# Patient Record
Sex: MALE | Race: WHITE | Employment: FULL TIME | URBAN - METROPOLITAN AREA
[De-identification: names, ages, dates, MRNs, and addresses within clinical notes are randomized per-mention and may not be internally consistent; named-entity substitution may affect disease eponyms.]

---

## 2024-02-05 ENCOUNTER — HOSPITAL ENCOUNTER (INPATIENT)
Facility: HOSPITAL | Age: 54
LOS: 3 days | Discharge: HOME | End: 2024-02-09
Attending: STUDENT IN AN ORGANIZED HEALTH CARE EDUCATION/TRAINING PROGRAM | Admitting: PSYCHIATRY & NEUROLOGY
Payer: COMMERCIAL

## 2024-02-05 DIAGNOSIS — R45.851 SUICIDAL IDEATION: Primary | ICD-10-CM

## 2024-02-05 DIAGNOSIS — F10.90 ALCOHOL USE DISORDER: ICD-10-CM

## 2024-02-05 DIAGNOSIS — F10.920 ACUTE ALCOHOLIC INTOXICATION WITHOUT COMPLICATION (HCC): ICD-10-CM

## 2024-02-05 LAB
ALBUMIN SERPL-MCNC: 3.8 G/DL (ref 3.5–5)
ALBUMIN/GLOB SERPL: 1 (ref 1.1–2.2)
ALP SERPL-CCNC: 293 U/L (ref 45–117)
ALT SERPL-CCNC: 73 U/L (ref 12–78)
AMPHET UR QL SCN: NEGATIVE
ANION GAP SERPL CALC-SCNC: 13 MMOL/L (ref 5–15)
APAP SERPL-MCNC: <2 UG/ML (ref 10–30)
APPEARANCE UR: CLEAR
AST SERPL-CCNC: 232 U/L (ref 15–37)
BACTERIA URNS QL MICRO: NEGATIVE /HPF
BARBITURATES UR QL SCN: NEGATIVE
BASOPHILS # BLD: 0.1 K/UL (ref 0–0.1)
BASOPHILS NFR BLD: 1 % (ref 0–1)
BENZODIAZ UR QL: NEGATIVE
BILIRUB SERPL-MCNC: 0.7 MG/DL (ref 0.2–1)
BILIRUB UR QL: NEGATIVE
BUN SERPL-MCNC: 8 MG/DL (ref 6–20)
BUN/CREAT SERPL: 9 (ref 12–20)
CALCIUM SERPL-MCNC: 8.5 MG/DL (ref 8.5–10.1)
CANNABINOIDS UR QL SCN: POSITIVE
CHLORIDE SERPL-SCNC: 104 MMOL/L (ref 97–108)
CO2 SERPL-SCNC: 25 MMOL/L (ref 21–32)
COCAINE UR QL SCN: NEGATIVE
COLOR UR: NORMAL
COMMENT:: NORMAL
CREAT SERPL-MCNC: 0.86 MG/DL (ref 0.7–1.3)
DIFFERENTIAL METHOD BLD: ABNORMAL
EOSINOPHIL # BLD: 0.1 K/UL (ref 0–0.4)
EOSINOPHIL NFR BLD: 1 % (ref 0–7)
EPITH CASTS URNS QL MICRO: NORMAL /LPF
ERYTHROCYTE [DISTWIDTH] IN BLOOD BY AUTOMATED COUNT: 12.7 % (ref 11.5–14.5)
ETHANOL SERPL-MCNC: 491 MG/DL (ref 0–0.08)
GLOBULIN SER CALC-MCNC: 3.9 G/DL (ref 2–4)
GLUCOSE SERPL-MCNC: 105 MG/DL (ref 65–100)
GLUCOSE UR STRIP.AUTO-MCNC: NEGATIVE MG/DL
HCT VFR BLD AUTO: 43.6 % (ref 36.6–50.3)
HGB BLD-MCNC: 15.6 G/DL (ref 12.1–17)
HGB UR QL STRIP: NEGATIVE
HYALINE CASTS URNS QL MICRO: NORMAL /LPF (ref 0–5)
IMM GRANULOCYTES # BLD AUTO: 0 K/UL (ref 0–0.04)
IMM GRANULOCYTES NFR BLD AUTO: 0 % (ref 0–0.5)
KETONES UR QL STRIP.AUTO: NEGATIVE MG/DL
LEUKOCYTE ESTERASE UR QL STRIP.AUTO: NEGATIVE
LYMPHOCYTES # BLD: 2.6 K/UL (ref 0.8–3.5)
LYMPHOCYTES NFR BLD: 28 % (ref 12–49)
Lab: ABNORMAL
MCH RBC QN AUTO: 36.5 PG (ref 26–34)
MCHC RBC AUTO-ENTMCNC: 35.8 G/DL (ref 30–36.5)
MCV RBC AUTO: 102.1 FL (ref 80–99)
METHADONE UR QL: NEGATIVE
MONOCYTES # BLD: 0.7 K/UL (ref 0–1)
MONOCYTES NFR BLD: 7 % (ref 5–13)
NEUTS SEG # BLD: 5.9 K/UL (ref 1.8–8)
NEUTS SEG NFR BLD: 63 % (ref 32–75)
NITRITE UR QL STRIP.AUTO: NEGATIVE
NRBC # BLD: 0 K/UL (ref 0–0.01)
NRBC BLD-RTO: 0 PER 100 WBC
OPIATES UR QL: NEGATIVE
PCP UR QL: NEGATIVE
PH UR STRIP: 6.5 (ref 5–8)
PLATELET # BLD AUTO: 341 K/UL (ref 150–400)
PMV BLD AUTO: 9.1 FL (ref 8.9–12.9)
POTASSIUM SERPL-SCNC: 3.6 MMOL/L (ref 3.5–5.1)
PROT SERPL-MCNC: 7.7 G/DL (ref 6.4–8.2)
PROT UR STRIP-MCNC: NEGATIVE MG/DL
RBC # BLD AUTO: 4.27 M/UL (ref 4.1–5.7)
RBC #/AREA URNS HPF: NORMAL /HPF (ref 0–5)
SALICYLATES SERPL-MCNC: 2.2 MG/DL (ref 2.8–20)
SODIUM SERPL-SCNC: 142 MMOL/L (ref 136–145)
SP GR UR REFRACTOMETRY: 1.01 (ref 1–1.03)
SPECIMEN HOLD: NORMAL
SPECIMEN HOLD: NORMAL
UROBILINOGEN UR QL STRIP.AUTO: 0.2 EU/DL (ref 0.2–1)
WBC # BLD AUTO: 9.3 K/UL (ref 4.1–11.1)
WBC URNS QL MICRO: NORMAL /HPF (ref 0–4)

## 2024-02-05 PROCEDURE — 36415 COLL VENOUS BLD VENIPUNCTURE: CPT

## 2024-02-05 PROCEDURE — 81001 URINALYSIS AUTO W/SCOPE: CPT

## 2024-02-05 PROCEDURE — 85025 COMPLETE CBC W/AUTO DIFF WBC: CPT

## 2024-02-05 PROCEDURE — 99285 EMERGENCY DEPT VISIT HI MDM: CPT

## 2024-02-05 PROCEDURE — 80307 DRUG TEST PRSMV CHEM ANLYZR: CPT

## 2024-02-05 PROCEDURE — 82077 ASSAY SPEC XCP UR&BREATH IA: CPT

## 2024-02-05 PROCEDURE — 80053 COMPREHEN METABOLIC PANEL: CPT

## 2024-02-05 PROCEDURE — 96374 THER/PROPH/DIAG INJ IV PUSH: CPT

## 2024-02-05 PROCEDURE — 6370000000 HC RX 637 (ALT 250 FOR IP): Performed by: STUDENT IN AN ORGANIZED HEALTH CARE EDUCATION/TRAINING PROGRAM

## 2024-02-05 PROCEDURE — 90791 PSYCH DIAGNOSTIC EVALUATION: CPT

## 2024-02-05 PROCEDURE — 6360000002 HC RX W HCPCS: Performed by: STUDENT IN AN ORGANIZED HEALTH CARE EDUCATION/TRAINING PROGRAM

## 2024-02-05 PROCEDURE — 80179 DRUG ASSAY SALICYLATE: CPT

## 2024-02-05 PROCEDURE — 80143 DRUG ASSAY ACETAMINOPHEN: CPT

## 2024-02-05 RX ORDER — ONDANSETRON 2 MG/ML
4 INJECTION INTRAMUSCULAR; INTRAVENOUS ONCE
Status: COMPLETED | OUTPATIENT
Start: 2024-02-05 | End: 2024-02-05

## 2024-02-05 RX ORDER — LORAZEPAM 1 MG/1
2 TABLET ORAL ONCE
Status: COMPLETED | OUTPATIENT
Start: 2024-02-05 | End: 2024-02-05

## 2024-02-05 RX ADMIN — ONDANSETRON HYDROCHLORIDE 4 MG: 2 INJECTION, SOLUTION INTRAMUSCULAR; INTRAVENOUS at 22:54

## 2024-02-05 RX ADMIN — LORAZEPAM 2 MG: 1 TABLET ORAL at 21:01

## 2024-02-05 ASSESSMENT — PAIN - FUNCTIONAL ASSESSMENT: PAIN_FUNCTIONAL_ASSESSMENT: NONE - DENIES PAIN

## 2024-02-05 NOTE — ED TRIAGE NOTES
Patient presents from home with complaints of suicidal thoughts with a plan and being intoxicated. Per EMS patient has history of depression and takes Zoloft. Patient reports his last drink was about and hour ago and he normally drinks half a bottle of vodka a day

## 2024-02-06 PROBLEM — F39 UNSPECIFIED MOOD (AFFECTIVE) DISORDER (HCC): Status: ACTIVE | Noted: 2024-02-06

## 2024-02-06 LAB
ETHANOL SERPL-MCNC: 46 MG/DL (ref 0–0.08)
SARS-COV-2 RNA RESP QL NAA+PROBE: NOT DETECTED
SOURCE: NORMAL

## 2024-02-06 PROCEDURE — 1240000000 HC EMOTIONAL WELLNESS R&B

## 2024-02-06 PROCEDURE — 36415 COLL VENOUS BLD VENIPUNCTURE: CPT

## 2024-02-06 PROCEDURE — 2580000003 HC RX 258: Performed by: EMERGENCY MEDICINE

## 2024-02-06 PROCEDURE — 6370000000 HC RX 637 (ALT 250 FOR IP): Performed by: EMERGENCY MEDICINE

## 2024-02-06 PROCEDURE — 87635 SARS-COV-2 COVID-19 AMP PRB: CPT

## 2024-02-06 PROCEDURE — 6370000000 HC RX 637 (ALT 250 FOR IP): Performed by: NURSE PRACTITIONER

## 2024-02-06 PROCEDURE — 82077 ASSAY SPEC XCP UR&BREATH IA: CPT

## 2024-02-06 RX ORDER — LORAZEPAM 1 MG/1
1 TABLET ORAL 3 TIMES DAILY
Status: DISCONTINUED | OUTPATIENT
Start: 2024-02-06 | End: 2024-02-08

## 2024-02-06 RX ORDER — HALOPERIDOL 5 MG/1
5 TABLET ORAL EVERY 4 HOURS PRN
Status: DISCONTINUED | OUTPATIENT
Start: 2024-02-06 | End: 2024-02-09 | Stop reason: HOSPADM

## 2024-02-06 RX ORDER — LOPERAMIDE HYDROCHLORIDE 2 MG/1
2 CAPSULE ORAL 4 TIMES DAILY PRN
Status: DISCONTINUED | OUTPATIENT
Start: 2024-02-06 | End: 2024-02-09 | Stop reason: HOSPADM

## 2024-02-06 RX ORDER — ACETAMINOPHEN 325 MG/1
650 TABLET ORAL EVERY 4 HOURS PRN
Status: DISCONTINUED | OUTPATIENT
Start: 2024-02-06 | End: 2024-02-06

## 2024-02-06 RX ORDER — FOLIC ACID 1 MG/1
1 TABLET ORAL DAILY
Status: DISCONTINUED | OUTPATIENT
Start: 2024-02-06 | End: 2024-02-09 | Stop reason: HOSPADM

## 2024-02-06 RX ORDER — LANOLIN ALCOHOL/MO/W.PET/CERES
100 CREAM (GRAM) TOPICAL DAILY
Status: DISCONTINUED | OUTPATIENT
Start: 2024-02-06 | End: 2024-02-09 | Stop reason: HOSPADM

## 2024-02-06 RX ORDER — LORAZEPAM 2 MG/1
2 TABLET ORAL
Status: DISCONTINUED | OUTPATIENT
Start: 2024-02-06 | End: 2024-02-09 | Stop reason: HOSPADM

## 2024-02-06 RX ORDER — LORAZEPAM 1 MG/1
2 TABLET ORAL
Status: COMPLETED | OUTPATIENT
Start: 2024-02-06 | End: 2024-02-06

## 2024-02-06 RX ORDER — ONDANSETRON 4 MG/1
4 TABLET, ORALLY DISINTEGRATING ORAL EVERY 8 HOURS PRN
Status: DISCONTINUED | OUTPATIENT
Start: 2024-02-06 | End: 2024-02-09 | Stop reason: HOSPADM

## 2024-02-06 RX ORDER — TRAZODONE HYDROCHLORIDE 50 MG/1
50 TABLET ORAL NIGHTLY PRN
Status: DISCONTINUED | OUTPATIENT
Start: 2024-02-06 | End: 2024-02-06

## 2024-02-06 RX ORDER — LORAZEPAM 2 MG/1
4 TABLET ORAL
Status: DISCONTINUED | OUTPATIENT
Start: 2024-02-06 | End: 2024-02-08

## 2024-02-06 RX ORDER — HALOPERIDOL 5 MG/ML
5 INJECTION INTRAMUSCULAR EVERY 4 HOURS PRN
Status: DISCONTINUED | OUTPATIENT
Start: 2024-02-06 | End: 2024-02-09 | Stop reason: HOSPADM

## 2024-02-06 RX ORDER — HYDROXYZINE HYDROCHLORIDE 25 MG/1
50 TABLET, FILM COATED ORAL 3 TIMES DAILY PRN
Status: DISCONTINUED | OUTPATIENT
Start: 2024-02-06 | End: 2024-02-09 | Stop reason: HOSPADM

## 2024-02-06 RX ORDER — POLYETHYLENE GLYCOL 3350 17 G/17G
17 POWDER, FOR SOLUTION ORAL DAILY PRN
Status: DISCONTINUED | OUTPATIENT
Start: 2024-02-06 | End: 2024-02-09 | Stop reason: HOSPADM

## 2024-02-06 RX ORDER — IBUPROFEN 400 MG/1
800 TABLET ORAL EVERY 6 HOURS PRN
Status: DISCONTINUED | OUTPATIENT
Start: 2024-02-06 | End: 2024-02-09 | Stop reason: HOSPADM

## 2024-02-06 RX ORDER — DIAZEPAM 5 MG/ML
5 INJECTION, SOLUTION INTRAMUSCULAR; INTRAVENOUS EVERY 4 HOURS PRN
Status: DISCONTINUED | OUTPATIENT
Start: 2024-02-06 | End: 2024-02-06

## 2024-02-06 RX ORDER — LORAZEPAM 1 MG/1
2 TABLET ORAL ONCE
Status: COMPLETED | OUTPATIENT
Start: 2024-02-06 | End: 2024-02-06

## 2024-02-06 RX ORDER — NICOTINE 21 MG/24HR
1 PATCH, TRANSDERMAL 24 HOURS TRANSDERMAL DAILY
Status: DISCONTINUED | OUTPATIENT
Start: 2024-02-06 | End: 2024-02-06

## 2024-02-06 RX ORDER — TRIAMCINOLONE ACETONIDE 1 MG/G
CREAM TOPICAL 2 TIMES DAILY
Status: DISCONTINUED | OUTPATIENT
Start: 2024-02-06 | End: 2024-02-09 | Stop reason: HOSPADM

## 2024-02-06 RX ORDER — SENNOSIDES A AND B 8.6 MG/1
1 TABLET, FILM COATED ORAL DAILY PRN
Status: DISCONTINUED | OUTPATIENT
Start: 2024-02-06 | End: 2024-02-09 | Stop reason: HOSPADM

## 2024-02-06 RX ORDER — MAGNESIUM HYDROXIDE/ALUMINUM HYDROXICE/SIMETHICONE 120; 1200; 1200 MG/30ML; MG/30ML; MG/30ML
30 SUSPENSION ORAL EVERY 6 HOURS PRN
Status: DISCONTINUED | OUTPATIENT
Start: 2024-02-06 | End: 2024-02-09 | Stop reason: HOSPADM

## 2024-02-06 RX ORDER — COAL TAR
SOLUTION, NON-ORAL MISCELLANEOUS
COMMUNITY

## 2024-02-06 RX ORDER — DIPHENHYDRAMINE HYDROCHLORIDE 50 MG/ML
50 INJECTION INTRAMUSCULAR; INTRAVENOUS EVERY 4 HOURS PRN
Status: DISCONTINUED | OUTPATIENT
Start: 2024-02-06 | End: 2024-02-09 | Stop reason: HOSPADM

## 2024-02-06 RX ORDER — TRAZODONE HYDROCHLORIDE 50 MG/1
50 TABLET ORAL NIGHTLY
Status: DISCONTINUED | OUTPATIENT
Start: 2024-02-06 | End: 2024-02-07

## 2024-02-06 RX ORDER — CLONIDINE HYDROCHLORIDE 0.1 MG/1
0.1 TABLET ORAL EVERY 6 HOURS PRN
Status: DISCONTINUED | OUTPATIENT
Start: 2024-02-06 | End: 2024-02-09 | Stop reason: HOSPADM

## 2024-02-06 RX ORDER — MULTIVITAMIN WITH IRON
1 TABLET ORAL DAILY
Status: DISCONTINUED | OUTPATIENT
Start: 2024-02-06 | End: 2024-02-09 | Stop reason: HOSPADM

## 2024-02-06 RX ORDER — LORAZEPAM 1 MG/1
1 TABLET ORAL
Status: DISCONTINUED | OUTPATIENT
Start: 2024-02-06 | End: 2024-02-09 | Stop reason: HOSPADM

## 2024-02-06 RX ORDER — 0.9 % SODIUM CHLORIDE 0.9 %
1000 INTRAVENOUS SOLUTION INTRAVENOUS ONCE
Status: COMPLETED | OUTPATIENT
Start: 2024-02-06 | End: 2024-02-06

## 2024-02-06 RX ADMIN — LORAZEPAM 2 MG: 1 TABLET ORAL at 00:35

## 2024-02-06 RX ADMIN — SODIUM CHLORIDE 1000 ML: 9 INJECTION, SOLUTION INTRAVENOUS at 04:24

## 2024-02-06 RX ADMIN — TRAZODONE HYDROCHLORIDE 50 MG: 50 TABLET ORAL at 21:30

## 2024-02-06 RX ADMIN — CLONIDINE HYDROCHLORIDE 0.1 MG: 0.1 TABLET ORAL at 16:43

## 2024-02-06 RX ADMIN — THERA TABS 1 TABLET: TAB at 11:37

## 2024-02-06 RX ADMIN — LORAZEPAM 1 MG: 1 TABLET ORAL at 23:13

## 2024-02-06 RX ADMIN — FOLIC ACID 1 MG: 1 TABLET ORAL at 11:36

## 2024-02-06 RX ADMIN — LORAZEPAM 1 MG: 1 TABLET ORAL at 21:30

## 2024-02-06 RX ADMIN — LORAZEPAM 1 MG: 1 TABLET ORAL at 11:35

## 2024-02-06 RX ADMIN — LORAZEPAM 1 MG: 1 TABLET ORAL at 16:43

## 2024-02-06 RX ADMIN — LORAZEPAM 1 MG: 1 TABLET ORAL at 14:43

## 2024-02-06 RX ADMIN — TRIAMCINOLONE ACETONIDE: 1 CREAM TOPICAL at 11:35

## 2024-02-06 RX ADMIN — IBUPROFEN 800 MG: 400 TABLET, FILM COATED ORAL at 16:43

## 2024-02-06 RX ADMIN — LORAZEPAM 1 MG: 1 TABLET ORAL at 20:31

## 2024-02-06 RX ADMIN — Medication 100 MG: at 11:37

## 2024-02-06 RX ADMIN — SERTRALINE HYDROCHLORIDE 50 MG: 50 TABLET ORAL at 11:37

## 2024-02-06 RX ADMIN — LORAZEPAM 2 MG: 1 TABLET ORAL at 06:59

## 2024-02-06 RX ADMIN — TRIAMCINOLONE ACETONIDE: 1 CREAM TOPICAL at 21:30

## 2024-02-06 ASSESSMENT — SLEEP AND FATIGUE QUESTIONNAIRES
DO YOU USE A SLEEP AID: YES
AVERAGE NUMBER OF SLEEP HOURS: 5
SLEEP PATTERN: DISTURBED/INTERRUPTED SLEEP
DO YOU HAVE DIFFICULTY SLEEPING: YES

## 2024-02-06 ASSESSMENT — LIFESTYLE VARIABLES
HOW OFTEN DO YOU HAVE A DRINK CONTAINING ALCOHOL: 4 OR MORE TIMES A WEEK
HOW OFTEN DO YOU HAVE A DRINK CONTAINING ALCOHOL: 4 OR MORE TIMES A WEEK
HOW MANY STANDARD DRINKS CONTAINING ALCOHOL DO YOU HAVE ON A TYPICAL DAY: 5 OR 6
HOW MANY STANDARD DRINKS CONTAINING ALCOHOL DO YOU HAVE ON A TYPICAL DAY: 5 OR 6

## 2024-02-06 NOTE — ED PROVIDER NOTES
ED SIGN OUT NOTE  Care assumed at [facility] 8:34 AM EST    Patient was signed out to me by Dr. Salas.     Patient signed out pending lab results and psychiatric placement      BP (!) 142/87   Pulse 83   Temp 98.3 °F (36.8 °C) (Oral)   Resp 18   Wt 74.8 kg (165 lb)   SpO2 95%   Labs Reviewed   CBC WITH AUTO DIFFERENTIAL - Abnormal; Notable for the following components:       Result Value    .1 (*)     MCH 36.5 (*)     All other components within normal limits   COMPREHENSIVE METABOLIC PANEL - Abnormal; Notable for the following components:    Glucose 105 (*)     Bun/Cre Ratio 9 (*)      (*)     Alk Phosphatase 293 (*)     Albumin/Globulin Ratio 1.0 (*)     All other components within normal limits   SALICYLATE LEVEL - Abnormal; Notable for the following components:    Salicylate, Serum 2.2 (*)     All other components within normal limits   ACETAMINOPHEN LEVEL - Abnormal; Notable for the following components:    Acetaminophen Level <2 (*)     All other components within normal limits   ETHANOL - Abnormal; Notable for the following components:    Ethanol Lvl 491 (*)     All other components within normal limits   URINE DRUG SCREEN - Abnormal; Notable for the following components:    THC, TH-Cannabinol, Urine Positive (*)     All other components within normal limits   ETHANOL - Abnormal; Notable for the following components:    Ethanol Lvl 46 (*)     All other components within normal limits   URINE CULTURE HOLD SAMPLE   COVID ONLY ([facility])   EXTRA TUBES HOLD   URINALYSIS WITH MICROSCOPIC     No orders to display        Diagnosis:   1. Suicidal ideation    2. Acute alcoholic intoxication without complication (HCC)      Disposition:   Admitted 02/06/2024 08:14:41 AM        Plan:     Patient presented for alcohol intoxication, suicidal ideation. Medically cleared by prior team. BSMART recommended admission, requested COVID test prior to placement.     COVID negative. BSMART

## 2024-02-06 NOTE — PROGRESS NOTES
Admission Medication Reconciliation:    Information obtained from:  patient interview, Insurance claims data, review of EMR, and [facility]  RxQuery data available¹:  YES    Comments/Recommendations: Updated PTA meds/reviewed patient's allergies.    1)  Mr. Garcia reports taking sertraline PTA. He sometimes forgets to take it while drinking but tries to take it regularly.     He has previously been prescribed buspirone and doxepin but he states he never took them. He has not been on any medications to assist with alcohol use disorder.     2)  The [place] Prescription Monitoring Program () was assessed to determine fill history of any controlled medications. The patient has filled the following controlled medications in the last  6 months.  - 12/13/23: oxycodone 5 mg, #30 for 7 day supply  - 12/13/23: phenobarbital 60 mg, #20 for 7 day supply  - 9/26/23: lorazepam 0.5 mg, #5 for 5 day supply    3)  Medication changes (since last review):  Added:  - coal tar PRN   ¹RxQuery pharmacy benefit data reflects medications filled and processed through the patient's insurance, however this data does NOT capture whether the medication was picked up or is currently being taken by the patient.    Allergies:  Penicillins and Sulfa antibiotics    Significant PMH/Disease States: No past medical history on file.    Chief Complaint for this Admission:    Chief Complaint   Patient presents with    Suicidal    Alcohol Intoxication     Prior to Admission Medications:   Prior to Admission Medications   Prescriptions Last Dose Informant   Coal [place] SOLN 2/4/2024    Sig: Apply topically as needed   sertraline (ZOLOFT) 50 MG tablet 2/4/2024    Sig: Take 1 tablet by mouth daily      Facility-Administered Medications: None     Mariel Hall [facility]

## 2024-02-06 NOTE — BSMART NOTE
Comprehensive Assessment Form Part 1      Section I - Disposition    Primary Diagnosis: Major Depressive disorder   Secondary Diagnosis: Alcohol dependence disorder   .med    The Medical Doctor to Psychiatrist conference was notcompleted.  The Medical Doctor is in agreement with Bsmart clinician disposition because of (reason) Pt meeting criteria for inpatient voluntary  hospitalization .  The plan is to admit to U if Pt medically clears.  The medical provider consulted was Dr. Lake Rubio.  The admitting Psychiatrist will be Dr. GUERRA.  The admitting Diagnosis is MDD.  The Payor source is [facility]   This writer reviewed with the columbia suicide severity rating scale in nursing flow sheet and the risk level assigned is No Risk .Based on this assessment the risk of suicide is High Risk and the plan is to admit into U once medically cleared    Section II - Integrated Summary  Summary:  Per Triage Note:  Patient presents from home with complaints of suicidal thoughts with a plan and being intoxicated. Per EMS patient has history of depression and takes Zoloft. Patient reports his last drink was about and hour ago and he normally drinks half a bottle of vodka a day    Writer met face-to-face at bedside with Pt who declined additional privacy to meet for interview. Pt appeared to be A&Ox4 endorsing SI with an attempted suicide by hanging himself today prior to coming into ED. Pt reports hx of attempted in the past to include last attempt 3 months prior by overdosing on \" a bottle of tylenol\". Pt shared that he was home drinking a full bottle of vodka before attempts to hang himself with a rope in his apartment prior to \"my boss Sharad Zamora walking in\". Pt reported to have been dropped off by this same person directly after being discovered and to have left his phone home. PT declined to recollect Mr.s Zamora's contact information. Pt was dressed in green gown while sitting up in bed reporting

## 2024-02-06 NOTE — ED NOTES
Report given to EVETTE Peck on patient going to 732. IV removed. Security called to escort patient with tech to unit.

## 2024-02-06 NOTE — ED PROVIDER NOTES
Patient signed out to me by previous physician pending medical clearance.  He presented due to alcohol intoxication and suicidal ideation.  He has metabolized his alcohol.  He presented tachycardic and his heart rate has subsequently improved.  Given the significant amount of alcohol he drank, alcohol withdrawal seems highly unlikely.  This in addition to the normalization of his heart rate argues against alcohol withdrawal as well.  He is medically cleared.  Plan is for psychiatric admission     Harrison Salas MD  02/06/24 0608

## 2024-02-06 NOTE — BSMART NOTE
Bsmart Progress Note;    Writer aware of bsmart consult . Pt very intoxicated and will be assessed at later time once closer to being medically cleared. Per Chart Pt CIWA score is 16.

## 2024-02-06 NOTE — ED NOTES
Pt. Repeatedly asking RN and 1:1 sitter for ativan. Pt. Notified X3 there is no order placed, the physician is aware

## 2024-02-06 NOTE — CARE COORDINATION
02/06/24 1400   ITP   Date of Plan 02/06/24   Date of Next Review 02/13/24   Barriers to Treatment Client resistance;Need for psychoeducation;Psychiatric symptom (comment)   Strengths Incorporated in Plan Acknowledging need for assistance;Seeking interactions   Plan of Care   Long Term Goal (LTG) Stated in patient/guardian terms To complete Subbtance use program   Short Term Goal 1   Short Term Goal 1 Client will be oriented to program and staff, and participate in assessment process   Baseline Functioning pt able to verbalize needs   Objectives Client will participate in group therapy   Intervention 1 Acknowledge client strengths   Frequency daily   Measured by Staff observation;Self report   Staff Responsible Clinical staff   Intervention 2 Group therapy   Frequency daily   Measured by Staff observation;Self report   Staff Responsible Clinical staff   Intervention 3 Monitor medications   Frequency daily   Measured by Staff observation;Behavioral data   Staff Responsible Clinical staff;[facility] staff   STG Goal 1 Status: Patient Appears to be  Progressing toward treatment plan goal   Crisis/Safety/Discharge Plan   Crisis/Safety Plan Standard program interventions and protocol   Comprehensive Assessment Completion Date 02/06/24   Discharge Plan Pt to discharge to substance use program

## 2024-02-06 NOTE — H&P
[facility] BEHAVIORAL HEALTH  INITIAL PSYCHIATRIC INTERVIEW:    Name: David Garcia  MR#: [id number]  ACCOUNT#: [id number]  : 1970  ADMIT DATE: 2024    CHIEF COMPLAINT: \"I'm just drinking way too much, and the depression and anxiety has gotten really bad.\"     HISTORY OF PRESENTING COMPLAINT:  This is a 53 y.o. male who is currently admitted to the acute psychiatric floor at [facility] on a voluntary basis for suicidal ideation in the context of acute alcohol intoxication. The pt has been drinking about 750ml of vodka daily. He reports he's struggled with alcohol use disorder and depression his whole life. The longest he's maintained recovery from alcohol was 90 days, about a year ago. He does report a hx of DTs and seizures. He has a longstanding hx of depression and anxiety. It was documented in the ED notes that the pt recently attempted to hang himself, but he denies any recent self-harm gestures or suicide attempts. He denies any SI/plan/intent currently, denies HI/plan/intent, endorses VH of shadows and tactile sensation of \"the creepy crawlies\" on his skin. He has been on 50mg Zoloft daily. He is notably restless, but calm, polite and appropriate during evaluation.     PAST PSYCHIATRIC HISTORY: The pt has a hx of detox-focused admissions, but no other psychiatric admissions. He denies any hx of suicide attempts. He does not have a psychiatrist or therapist currently.     PAST MEDICATION TRIALS:   Buspar - prescribed but never took  Doxepin - prescribed but never took  Zoloft   Librium  Ativan    SUBSTANCE ABUSE HISTORY:  750ml liquor daily - hx of 2 withdrawal seizures. Has done outpatient treatment before at [facility] and has done detox at [facility]. Longest period of recovery was 90 days, about a year ago.   THC gummies use several times a week  Quit smoking 4 weeks ago  Hx of using Kratom, quit 2 weeks ago    PSYCHOSOCIAL HISTORY: The pt lives with a roommate who is

## 2024-02-06 NOTE — ED NOTES
Pt. Observed sticking his fingers down his throat to induce vomiting. Pt. Dry heaving. Pt. Also drinking soda, no vomiting observed

## 2024-02-06 NOTE — BSMART NOTE
BSMART assessment completed, and suicide risk level noted to be High Risk . Charge Nurse Dulce and Physician Joanne notified. Concerns observed by Pt reporting to be going through withdrawals and concerned that he will have a seizure. Pt reported to have found attempting to hang himself today by his boss and to have drink an entire bottle of vodka today with last drinking being around 3pm. Pt report hx of daily ETOH \"all my life\". Pt denied hx of psychiatric admission outside of rehab ([facility] 1 year prior) . Pt pleasant and thanked writer for speaking with him. Writer will complete full interview once Pt is medically cleared due to Pt's BAL being 491

## 2024-02-06 NOTE — PLAN OF CARE
Problem: Drug Abuse/Detox  Goal: Will have no detox symptoms and will verbalize plan for changing drug-related behavior  Description: INTERVENTIONS:  1. Administer medication as ordered  2. Monitor physical status  3. Provide emotional support with 1:1 interaction with staff  4. Encourage  recovery focused treatment   Outcome: Not Progressing     Problem: Behavior  Goal: Pt/Family maintain appropriate behavior and adhere to behavioral management agreement, if implemented  Description: INTERVENTIONS:  1. Assess patient/family's coping skills and  non-compliant behavior (including use of illegal substances)  2. Notify security of behavior or suspected illegal substances which indicate the need for search of the family and/or belongings  3. Encourage verbalization of thoughts and concerns in a socially appropriate manner  4. Utilize positive, consistent limit setting strategies supporting safety of patient, staff and others  5. Encourage participation in the decision making process about the behavioral management agreement  6. If a visitor's behavior poses a threat to safety call refer to organization policy.  7. Initiate consult with , Psychosocial CNS, Spiritual Care as appropriate  Outcome: Progressing     Problem: Anxiety  Goal: Will report anxiety at manageable levels  Description: INTERVENTIONS:  1. Administer medication as ordered  2. Teach and rehearse alternative coping skills  3. Provide emotional support with 1:1 interaction with staff  Outcome: Progressing     Problem: Drug Abuse/Detox  Goal: Will have no detox symptoms and will verbalize plan for changing drug-related behavior  Description: INTERVENTIONS:  1. Administer medication as ordered  2. Monitor physical status  3. Provide emotional support with 1:1 interaction with staff  4. Encourage  recovery focused treatment   Outcome: Not Progressing     Patient is actively withdrawing from alcohol abuse. Medication compliant. Denies

## 2024-02-06 NOTE — ED PROVIDER NOTES
[facility] 7W ACUTE [facility]  EMERGENCY DEPARTMENT ENCOUNTER      Pt Name: David Garcia  MRN: [id number]  Birthdate 1970  Date of evaluation: 2024  Provider: Lake Rubio MD    CHIEF COMPLAINT       Chief Complaint   Patient presents with    Suicidal    Alcohol Intoxication       HISTORY OF PRESENT ILLNESS    HPI    53M hx of alcohol abuse and depression here for alcohol intoxication and SI. Drinks around 0.5 liter vodka daily, last drink at 3pm. Reports feeling jittery and anxious, concern that he is going to withdraw. Also reports depression, suicidal over the past few days. Reportedly his boss walked in on him today as he was preparing to hang himself. He denies auditory or visual hallucinations.  \  Nursing notes reviewed.    REVIEW OF SYSTEMS     Review of Systems  Unless otherwise stated, a complete review of systems was asked of the patient. Pertinent positives are noted in the HPI section.    PAST MEDICAL HISTORY   History reviewed. No pertinent past medical history.    SURGICAL HISTORY     No past surgical history on file.    CURRENT MEDICATIONS       Current Discharge Medication List        CONTINUE these medications which have NOT CHANGED    Details   sertraline (ZOLOFT) 50 MG tablet Take 1 tablet by mouth daily      Coal Tar SOLN Apply topically as needed             ALLERGIES     Penicillins and Sulfa antibiotics    FAMILY HISTORY     No family history on file.     SOCIAL HISTORY       Social History     Socioeconomic History    Marital status:      Spouse name: None    Number of children: None    Years of education: None    Highest education level: None   Tobacco Use    Smoking status: Former     Current packs/day: 0.00     Types: Cigarettes     Quit date: 2023     Years since quittin.5    Smokeless tobacco: Never   Substance and Sexual Activity    Alcohol use: Yes     Alcohol/week: 5.0 standard drinks of alcohol     Types: 5 Standard drinks or equivalent per week    Drug  course    RADIOLOGY:   No orders to display        LABS:  Labs Reviewed   CBC WITH AUTO DIFFERENTIAL - Abnormal; Notable for the following components:       Result Value    .1 (*)     MCH 36.5 (*)     All other components within normal limits   COMPREHENSIVE METABOLIC PANEL - Abnormal; Notable for the following components:    Glucose 105 (*)     Bun/Cre Ratio 9 (*)      (*)     Alk Phosphatase 293 (*)     Albumin/Globulin Ratio 1.0 (*)     All other components within normal limits   SALICYLATE LEVEL - Abnormal; Notable for the following components:    Salicylate, Serum 2.2 (*)     All other components within normal limits   ACETAMINOPHEN LEVEL - Abnormal; Notable for the following components:    Acetaminophen Level <2 (*)     All other components within normal limits   ETHANOL - Abnormal; Notable for the following components:    Ethanol Lvl 491 (*)     All other components within normal limits   URINE DRUG SCREEN - Abnormal; Notable for the following components:    THC, TH-Cannabinol, Urine Positive (*)     All other components within normal limits   ETHANOL - Abnormal; Notable for the following components:    Ethanol Lvl 46 (*)     All other components within normal limits   URINE CULTURE HOLD SAMPLE   COVID ONLY ([facility])   EXTRA TUBES HOLD   URINALYSIS WITH MICROSCOPIC       All other labs were within normal range or not returned as of this dictation.    EMERGENCY DEPARTMENT COURSE and DIFFERENTIAL DIAGNOSIS/MDM:   Vitals:    Vitals:    02/06/24 0823 02/06/24 0852 02/06/24 1143 02/06/24 1230   BP: (!) 142/87 (!) 143/103 (!) 110/97    Pulse: 83 (!) 102 (!) 105    Resp: 18 16 18    Temp: 98.3 °F (36.8 °C) 98.3 °F (36.8 °C) 98.1 °F (36.7 °C)    TempSrc: Oral Oral Oral    SpO2: 95% 98%     Weight:    74.8 kg (165 lb)   Height:    1.854 m (6' 1\")       Medical Decision Making  53-year-old male with history of depression, presenting for SI.  Had possible suicide attempt today, reports continuing to

## 2024-02-07 PROCEDURE — 93005 ELECTROCARDIOGRAM TRACING: CPT | Performed by: PSYCHIATRY & NEUROLOGY

## 2024-02-07 PROCEDURE — 6370000000 HC RX 637 (ALT 250 FOR IP): Performed by: PSYCHIATRY & NEUROLOGY

## 2024-02-07 PROCEDURE — 6370000000 HC RX 637 (ALT 250 FOR IP): Performed by: NURSE PRACTITIONER

## 2024-02-07 PROCEDURE — 1240000000 HC EMOTIONAL WELLNESS R&B

## 2024-02-07 RX ORDER — TRAZODONE HYDROCHLORIDE 50 MG/1
100 TABLET ORAL NIGHTLY
Status: DISCONTINUED | OUTPATIENT
Start: 2024-02-07 | End: 2024-02-08

## 2024-02-07 RX ADMIN — LORAZEPAM 1 MG: 1 TABLET ORAL at 20:00

## 2024-02-07 RX ADMIN — IBUPROFEN 800 MG: 400 TABLET, FILM COATED ORAL at 23:23

## 2024-02-07 RX ADMIN — HYDROXYZINE HYDROCHLORIDE 50 MG: 25 TABLET, FILM COATED ORAL at 16:34

## 2024-02-07 RX ADMIN — CLONIDINE HYDROCHLORIDE 0.1 MG: 0.1 TABLET ORAL at 09:14

## 2024-02-07 RX ADMIN — LORAZEPAM 1 MG: 1 TABLET ORAL at 16:13

## 2024-02-07 RX ADMIN — ONDANSETRON 4 MG: 4 TABLET, ORALLY DISINTEGRATING ORAL at 12:54

## 2024-02-07 RX ADMIN — LORAZEPAM 1 MG: 1 TABLET ORAL at 14:12

## 2024-02-07 RX ADMIN — IBUPROFEN 800 MG: 400 TABLET, FILM COATED ORAL at 16:35

## 2024-02-07 RX ADMIN — LORAZEPAM 1 MG: 1 TABLET ORAL at 12:53

## 2024-02-07 RX ADMIN — FOLIC ACID 1 MG: 1 TABLET ORAL at 09:14

## 2024-02-07 RX ADMIN — SERTRALINE HYDROCHLORIDE 50 MG: 50 TABLET ORAL at 09:14

## 2024-02-07 RX ADMIN — LORAZEPAM 1 MG: 1 TABLET ORAL at 10:19

## 2024-02-07 RX ADMIN — Medication 100 MG: at 09:14

## 2024-02-07 RX ADMIN — THERA TABS 1 TABLET: TAB at 09:14

## 2024-02-07 RX ADMIN — TRAZODONE HYDROCHLORIDE 100 MG: 50 TABLET ORAL at 21:10

## 2024-02-07 RX ADMIN — LORAZEPAM 1 MG: 1 TABLET ORAL at 09:14

## 2024-02-07 RX ADMIN — LOPERAMIDE HYDROCHLORIDE 2 MG: 2 CAPSULE ORAL at 12:54

## 2024-02-07 RX ADMIN — TRIAMCINOLONE ACETONIDE: 1 CREAM TOPICAL at 09:20

## 2024-02-07 RX ADMIN — HYDROXYZINE HYDROCHLORIDE 50 MG: 25 TABLET, FILM COATED ORAL at 23:24

## 2024-02-07 ASSESSMENT — PAIN SCALES - GENERAL
PAINLEVEL_OUTOF10: 0
PAINLEVEL_OUTOF10: 4

## 2024-02-07 ASSESSMENT — PAIN DESCRIPTION - ORIENTATION: ORIENTATION: OTHER (COMMENT)

## 2024-02-07 ASSESSMENT — PAIN - FUNCTIONAL ASSESSMENT
PAIN_FUNCTIONAL_ASSESSMENT: ACTIVITIES ARE NOT PREVENTED
PAIN_FUNCTIONAL_ASSESSMENT: NONE - DENIES PAIN

## 2024-02-07 ASSESSMENT — PAIN DESCRIPTION - DESCRIPTORS: DESCRIPTORS: ACHING;THROBBING

## 2024-02-07 ASSESSMENT — PAIN DESCRIPTION - LOCATION: LOCATION: OTHER (COMMENT)

## 2024-02-07 ASSESSMENT — PAIN SCALES - WONG BAKER: WONGBAKER_NUMERICALRESPONSE: 0

## 2024-02-07 NOTE — PLAN OF CARE
Problem: Depression  Goal: Will be euthymic at discharge  Description: INTERVENTIONS:  1. Administer medication as ordered  2. Provide emotional support via 1:1 interaction with staff  3. Encourage involvement in milieu/groups/activities  4. Monitor for social isolation  Outcome: Progressing     Problem: Behavior  Goal: Pt/Family maintain appropriate behavior and adhere to behavioral management agreement, if implemented  Description: INTERVENTIONS:  1. Assess patient/family's coping skills and  non-compliant behavior (including use of illegal substances)  2. Notify security of behavior or suspected illegal substances which indicate the need for search of the family and/or belongings  3. Encourage verbalization of thoughts and concerns in a socially appropriate manner  4. Utilize positive, consistent limit setting strategies supporting safety of patient, staff and others  5. Encourage participation in the decision making process about the behavioral management agreement  6. If a visitor's behavior poses a threat to safety call refer to organization policy.  7. Initiate consult with , Psychosocial CNS, Spiritual Care as appropriate  Outcome: Progressing     Problem: Safety - Adult  Goal: Free from fall injury  2/6/2024 2353 by Jessica Rosa LPN  Outcome: Progressing    Blocked Bed Documentation:    Room number: 732  Type: Behavior  Rationale: Poor Sleep Pattern  Anticipated duration: TBD qshift  Additional comments: ETOH withdrawal      1100: Patient is participatory in treatment team. Mood and affect; calm, cooperative and pleasant. Patient voices difficulties with ETOH withdrawal today being day 3. PRN medication education provided to patient to help alleviate symptoms. Denies SI/HI. Denies AH. Patient did report seeing what he felt like were shadows out of the corner of his eye yesterday but has not experienced that today. Medication and meal complaint. Plans are to increase Trazadone to help

## 2024-02-07 NOTE — INTERDISCIPLINARY ROUNDS
Behavioral Health Interdisciplinary Rounds     Patient Name: David Garcia  Age: 53 y.o.  Room/Bed:  [facility]  Primary Diagnosis: Unspecified mood (affective) disorder (HCC)   Admission Status: Voluntary    Readmission within 30 days: No  Power of  in place: No  Patient requires a blocked bed: Yes          Reason for blocked bed: detox  Sleep hours:       Participation in Care/Groups:  Yes  Medication Compliant?: Yes  PRNS (last 24 hours): catapres, ativan  Restraints (last 24 hours):  No  __________________________________________________  OQ Admission Analysis Survey completed:  OQ Admission Analysis Survey score:  __________________________________________________     Alcohol screening (AUDIT) completed -     If applicable, date SBIRT discussed in treatment team AND documented:    Tobacco - patient is a smoker:    Illegal Drugs use:      24 hour chart check complete: Yes    _______________________________________________    Patient goal(s) for today: Express needs to staff  Treatment team focus/goals: Address needs and update tx plan as needed  Progress note: Pt med with tx team and reports \"today is definitely the worst day,\" referring to numerous withdrawal symptoms. Even though pt appears ill, affect is agreeable and friendly. Today is day 3 of pt withdrawal, NP advised that today should be the worst day of symptoms. Pt reports feeling ill, not sleeping well, sweating, blood pressure fluctuations, and feeling unsteady overall. NP increased Trazodone to improve sleep. Pt reports \"seeing shadows\" yesterday and feeling like his skin was crawling/itching. Pt is interested in doing a partial program because he would like to be able to maintain his work schedule upon d/c.                                                                       LOS:  1  Expected LOS:     Participating treatment team members: David WhitmansPretty NP, Marleni LAFLEUR RN, Chrissie HARDING MSW Student

## 2024-02-07 NOTE — BH NOTE
[facility]  PSYCHIATRIC PROGRESS NOTE    Patient: David Garcia MRN: [id number]  SSN: [id number]    YOB: 1970  Age: 53 y.o.  Sex: male      Admit Date: 2/5/2024    Length of Stay: 1 Days    Legal Status: Voluntary    Chief Complaint: \"This is the worst day.\"     Interval History:   2/7/24- CIWA was 15 last night, then 8 and then 5 this morning. He continues to struggle with his detox symptoms, reports severe sweating at night, tremors, still feels \"wobbly,\" BP has been elevated. He has been eating some, not as much as he should but he is eating some from each meal, and is working on hydrating. Sleep was very poor last night. He denies any SI/HI, is not currently endorsing any AH or VH today.     Vitals:  Patient Vitals for the past 24 hrs:   Temp Pulse Resp BP SpO2   02/07/24 0946 -- 88 20 (!) 138/94 98 %   02/07/24 0854 -- 87 -- (!) 151/107 --   02/07/24 0852 98.6 °F (37 °C) 87 20 (!) 151/108 100 %   02/06/24 2001 -- 78 -- 119/69 --   02/06/24 1930 98.2 °F (36.8 °C) 92 16 (!) 148/103 --   02/06/24 1730 -- -- -- (!) 144/89 --   02/06/24 1604 98.6 °F (37 °C) 93 18 (!) 151/101 --   02/06/24 1143 98.1 °F (36.7 °C) (!) 105 18 (!) 110/97 --     Labs:  Lab Results   Component Value Date/Time    WBC 9.3 02/05/2024 06:31 PM    HGB 15.6 02/05/2024 06:31 PM    HCT 43.6 02/05/2024 06:31 PM     02/05/2024 06:31 PM    .1 02/05/2024 06:31 PM      Lab Results   Component Value Date/Time     02/05/2024 06:31 PM    K 3.6 02/05/2024 06:31 PM     02/05/2024 06:31 PM    CO2 25 02/05/2024 06:31 PM    BUN 8 02/05/2024 06:31 PM    GLOB 3.9 02/05/2024 06:31 PM    ALT 73 02/05/2024 06:31 PM      Scheduled Meds:   thiamine  100 mg Oral Daily    folic acid  1 mg Oral Daily    multivitamin  1 tablet Oral Daily    LORazepam  1 mg Oral TID    sertraline  50 mg Oral Daily    traZODone  50 mg Oral Nightly    triamcinolone   Topical BID     PRN Meds:  polyethylene glycol, senna, aluminum &

## 2024-02-07 NOTE — PLAN OF CARE
Problem: Safety - Adult  Goal: Free from fall injury  Outcome: Progressing     Recieved resting in bed with eyes closed, appears to be sleeping. No respiratory distress noted, respirations even and unlabored. NAD. Pathways are free of clutter. Q15 min safety checks in place.

## 2024-02-07 NOTE — BH NOTE
GROUP THERAPY PROGRESS NOTE  No groups due to Low patient participation or acuity. SW provided pts with activities to complete independently.     Katherine Gillies, MSW, [facility]

## 2024-02-08 LAB
EKG ATRIAL RATE: 77 BPM
EKG DIAGNOSIS: NORMAL
EKG P AXIS: 16 DEGREES
EKG P-R INTERVAL: 150 MS
EKG Q-T INTERVAL: 396 MS
EKG QRS DURATION: 94 MS
EKG QTC CALCULATION (BAZETT): 448 MS
EKG R AXIS: 0 DEGREES
EKG T AXIS: 37 DEGREES
EKG VENTRICULAR RATE: 77 BPM

## 2024-02-08 PROCEDURE — 93010 ELECTROCARDIOGRAM REPORT: CPT | Performed by: SPECIALIST

## 2024-02-08 PROCEDURE — 1240000000 HC EMOTIONAL WELLNESS R&B

## 2024-02-08 PROCEDURE — 6370000000 HC RX 637 (ALT 250 FOR IP): Performed by: NURSE PRACTITIONER

## 2024-02-08 PROCEDURE — 6370000000 HC RX 637 (ALT 250 FOR IP): Performed by: PSYCHIATRY & NEUROLOGY

## 2024-02-08 RX ORDER — MIRTAZAPINE 15 MG/1
15 TABLET, FILM COATED ORAL NIGHTLY
Status: DISCONTINUED | OUTPATIENT
Start: 2024-02-08 | End: 2024-02-09 | Stop reason: HOSPADM

## 2024-02-08 RX ORDER — LORAZEPAM 0.5 MG/1
0.5 TABLET ORAL 3 TIMES DAILY
Status: DISCONTINUED | OUTPATIENT
Start: 2024-02-08 | End: 2024-02-09 | Stop reason: HOSPADM

## 2024-02-08 RX ADMIN — CLONIDINE HYDROCHLORIDE 0.1 MG: 0.1 TABLET ORAL at 10:52

## 2024-02-08 RX ADMIN — SERTRALINE HYDROCHLORIDE 100 MG: 50 TABLET ORAL at 08:17

## 2024-02-08 RX ADMIN — TRIAMCINOLONE ACETONIDE: 1 CREAM TOPICAL at 11:56

## 2024-02-08 RX ADMIN — HYDROXYZINE HYDROCHLORIDE 50 MG: 25 TABLET, FILM COATED ORAL at 08:53

## 2024-02-08 RX ADMIN — THERA TABS 1 TABLET: TAB at 08:17

## 2024-02-08 RX ADMIN — LORAZEPAM 0.5 MG: 0.5 TABLET ORAL at 20:43

## 2024-02-08 RX ADMIN — IBUPROFEN 800 MG: 400 TABLET, FILM COATED ORAL at 10:56

## 2024-02-08 RX ADMIN — LORAZEPAM 1 MG: 1 TABLET ORAL at 08:17

## 2024-02-08 RX ADMIN — HYDROXYZINE HYDROCHLORIDE 50 MG: 25 TABLET, FILM COATED ORAL at 16:53

## 2024-02-08 RX ADMIN — LORAZEPAM 1 MG: 1 TABLET ORAL at 13:51

## 2024-02-08 RX ADMIN — LORAZEPAM 1 MG: 1 TABLET ORAL at 00:58

## 2024-02-08 RX ADMIN — Medication 100 MG: at 08:17

## 2024-02-08 RX ADMIN — CLONIDINE HYDROCHLORIDE 0.1 MG: 0.1 TABLET ORAL at 19:35

## 2024-02-08 RX ADMIN — FOLIC ACID 1 MG: 1 TABLET ORAL at 08:17

## 2024-02-08 RX ADMIN — TRIAMCINOLONE ACETONIDE: 1 CREAM TOPICAL at 20:01

## 2024-02-08 RX ADMIN — MIRTAZAPINE 15 MG: 15 TABLET, FILM COATED ORAL at 20:43

## 2024-02-08 ASSESSMENT — PAIN DESCRIPTION - LOCATION
LOCATION: EYE
LOCATION: EYE
LOCATION: GENERALIZED

## 2024-02-08 ASSESSMENT — PAIN SCALES - GENERAL
PAINLEVEL_OUTOF10: 4
PAINLEVEL_OUTOF10: 5
PAINLEVEL_OUTOF10: 0
PAINLEVEL_OUTOF10: 7

## 2024-02-08 ASSESSMENT — PAIN DESCRIPTION - ORIENTATION
ORIENTATION: LEFT
ORIENTATION: LEFT

## 2024-02-08 ASSESSMENT — PAIN - FUNCTIONAL ASSESSMENT: PAIN_FUNCTIONAL_ASSESSMENT: NONE - DENIES PAIN

## 2024-02-08 NOTE — BH NOTE
PRN Medication Documentation    Specific patient behavior that led to need for PRN medication: patient reported anxiety and requested medication  Staff interventions attempted prior to PRN being given: diversion, calming techniques   PRN medication given: Atarax 50 mg   Patient response/effectiveness of PRN medication: Patient reports no further needs at this time

## 2024-02-08 NOTE — PLAN OF CARE
Problem: Safety - Adult  Goal: Free from fall injury  Outcome: Progressing   Will continue safety check as per policy   Requested Prescriptions   Pending Prescriptions Disp Refills     amphetamine-dextroamphetamine (ADDERALL XR) 20 MG 24 hr capsule [Pharmacy Med Name: AMPHETAMINE SALTS *ER* 20MG CAP]  AMPHETAMINE SALTS *ER* 20MG CAP      Last Written Prescription Date:  12/16/19  Last Fill Quantity: 30,   # refills: 0  Last Office Visit: 11/18/19  ARTURO Funk  Future Office visit:       Routing refill request to provider for review/approval because:  Drug not on the [facility], [facility] or [facility] refill protocol or controlled substance   30 capsule 0     Sig: TAKE ONE CAPSULE BY MOUTH EVERY MORNING       There is no refill protocol information for this order

## 2024-02-08 NOTE — PLAN OF CARE
Problem: Anxiety  Goal: Will report anxiety at manageable levels  Description: INTERVENTIONS:  1. Administer medication as ordered  2. Teach and rehearse alternative coping skills  3. Provide emotional support with 1:1 interaction with staff  2/7/2024 5704 by Prosper Hill RN  Outcome: Progressing    Problem: Drug Abuse/Detox  Goal: Will have no detox symptoms and will verbalize plan for changing drug-related behavior  Description: INTERVENTIONS:  1. Administer medication as ordered  2. Monitor physical status  3. Provide emotional support with 1:1 interaction with staff  4. Encourage  recovery focused treatment   Outcome: Progressing

## 2024-02-08 NOTE — PROGRESS NOTES
SPIRITUALITY GROUP      Meeting Topic: Methods of Spiritual Pathways    Meeting Time:  45-60 minutes    Meeting Goal: Patients will describe personal definition of spirituality. Group will identify how spirituality or Restorationism beliefs are relevant to their mental health.  will discuss various approaches to making spiritual connections and invite the group to explore a new spiritual practice.     Today meeting focus: Short Centering Breath Meditation    David Garcia attended and participated in the group appropriately respective of their current diagnosis.        For additional spiritual care, please contact the  on-call at ([phone]).    Jamia Casillas MDiv, MS, [facility]  Staff   Spiritual Health Services

## 2024-02-08 NOTE — BH NOTE
PRN Medication Documentation    Specific patient behavior that led to need for PRN medication: pt c/o anxiety r/t alcohol detox  Staff interventions attempted prior to PRN being given: emotional support, continued education regarding detox symptomology   PRN medication given: atarax 50 mg po  Patient response/effectiveness of PRN medication: fair effect

## 2024-02-08 NOTE — PLAN OF CARE
Send Perfect Serve to hospitalist for consult regarding left pupil being larger than right pupil, patient complaining of pain and reduced vision left eye.    Problem: Depression  Goal: Will be euthymic at discharge  Description: INTERVENTIONS:  1. Administer medication as ordered  2. Provide emotional support via 1:1 interaction with staff  3. Encourage involvement in milieu/groups/activities  4. Monitor for social isolation  2/8/2024 0851 by Lisa Terrell, RN  Outcome: Progressing  2/7/2024 2358 by Prosper Hill RN  Outcome: Progressing     Problem: Anxiety  Goal: Will report anxiety at manageable levels  Description: INTERVENTIONS:  1. Administer medication as ordered  2. Teach and rehearse alternative coping skills  3. Provide emotional support with 1:1 interaction with staff  2/8/2024 0851 by Lisa Terrell RN  Outcome: Progressing  2/7/2024 2358 by Prosper Hill RN  Outcome: Progressing     Problem: Drug Abuse/Detox  Goal: Will have no detox symptoms and will verbalize plan for changing drug-related behavior  Description: INTERVENTIONS:  1. Administer medication as ordered  2. Monitor physical status  3. Provide emotional support with 1:1 interaction with staff  4. Encourage  recovery focused treatment   Outcome: Progressing    Patient is cooperative, anxious, medication compliant. Denies si/hi/a/v/h. Future focused, planning safety and sobriety after discharge.

## 2024-02-08 NOTE — INTERDISCIPLINARY ROUNDS
Behavioral Health Interdisciplinary Rounds     Patient Name: David Garcia  Age: 53 y.o.  Room/Bed:  2/  Primary Diagnosis: Unspecified mood (affective) disorder (HCC)   Admission Status: Voluntary    Readmission within 30 days: No  Power of  in place: No  Patient requires a blocked bed: Yes          Reason for blocked bed: detox  Sleep hours:        Participation in Care/Groups:  Yes  Medication Compliant?: Yes  PRNS (last 24 hours):  atarax, ibuprofen, imodium. Ativan. Zofran    Restraints (last 24 hours):  No  __________________________________________________  OQ Admission Analysis Survey completed:  OQ Admission Analysis Survey score:  __________________________________________________     Alcohol screening (AUDIT) completed -     If applicable, date SBIRT discussed in treatment team AND documented:    Tobacco - patient is a smoker:    Illegal Drugs use:      24 hour chart check complete: Yes    _______________________________________________    Patient goal(s) for today: Express needs to staff, complete menu  Treatment team focus/goals: Assess progress and adjust tx plan as needed  Progress note: Pt met with tx team. Pt reports feeling \"much better\" today. Pt appeared more calm and clear-minded than previous day. Speech was no longer pressured, hand tremors appear to have decreased. Pt reports he did not sleep well and believes the medication may be giving him wrestles legs, NP switched Trazodone to Remeron. Pt expressed wanting to d/c tomorrow, but understands proper detox is the most important factor. Tx team advised Pt to wait until the weekend for d/c to ensure safe detox. SW to look into possible substance use resources that will work with Pts work schedule.        LOS:  2  Expected LOS:     Participating treatment team members: Pretty Gallagher, Mariel MAGAÑA, RP, NP, Lisa ARORA RN, Chrissie HARDING, MSW Student

## 2024-02-08 NOTE — DISCHARGE INSTRUCTIONS
DISCHARGE SUMMARY    NAME:David Garcia  : 1970  MRN: [id number]    The patient David Garcia exhibits the ability to control behavior in a less restrictive environment.  Patient's level of functioning is improving.  No assaultive/destructive behavior has been observed for the past 24 hours.  No suicidal/homicidal threat or behavior has been observed for the past 24 hours.  There is no evidence of serious medication side effects.  Patient has not been in physical or protective restraints for at least the past 24 hours.    If weapons involved, how are they secured? None    Is patient aware of and in agreement with discharge plan? Yes    Arrangements for medication:  Prescriptions filled through [facility], 30 day supply provided    Copy of discharge instructions to provider?:  Yes    Arrangements for transportation home: Lyft    Keep all follow up appointments as scheduled, continue to take prescribed medications per physician instructions.  Mental health crisis number:  911 or your local mental health crisis line number at [facility] at [phone]      Mental Health Emergency WARM LINE      [phone])      M-F: 9am to 9pm      Sat & Sun: 5pm - 9pm  National suicide prevention lines:                             2-342-DWECQZV (1-757.810.4720)       2-057-251-TALK (1-730.218.3612)    Crisis Text Line:  Text HOME to 397807      DISCHARGE SUMMARY from Nurse    PATIENT INSTRUCTIONS:    What to do at Home:  Recommended activity: activity as tolerated,     If you experience any of the following symptoms, thought of hurting yourself, relapsing, please follow up with calling 911 or your local mental health crisis line number at [facility] at [phone].    *  Please give a list of your current medications to your Primary Care Provider.    *  Please update this list whenever your medications are discontinued, doses are      changed, or new medications (including over-the-counter

## 2024-02-08 NOTE — BH NOTE
[facility]  PSYCHIATRIC PROGRESS NOTE    Patient: David Garcia MRN: [id number]  SSN: [id number]    YOB: 1970  Age: 53 y.o.  Sex: male      Admit Date: 2/5/2024    Length of Stay: 2 Days    Legal Status: Voluntary    Chief Complaint: \"I'm much better.\"     Interval History:   2/8/24- David is doing better. He feels better physically and mentally, and is eating more. He denies any SI/plan/intent, denies HI/plan/intent, denies AH/VH. He did not sleep well, however, and felt the trazodone caused restlessness. Mood is good today, and affect remains bright. He has continued to require extra Ativan for his CIWA scores - including scheduled 1mg TID, he had a total of 6mg Ativan yesterday. He is discharge focused, asking when he can be discharged, but did verbalize understanding of the risks of withdrawal and verbalized understanding that it is not safe to go home while still detoxing, and that stopping Ativan abruptly could put him at risk for further adverse effects such as seizures.     2/7/24- CIWA was 15 last night, then 8 and then 5 this morning. He continues to struggle with his detox symptoms, reports severe sweating at night, tremors, still feels \"wobbly,\" BP has been elevated. He has been eating some, not as much as he should but he is eating some from each meal, and is working on hydrating. Sleep was very poor last night. He denies any SI/HI, is not currently endorsing any AH or VH today.     Vitals:  Patient Vitals for the past 24 hrs:   Temp Pulse Resp BP SpO2   02/08/24 0736 97.6 °F (36.4 °C) 88 15 (!) 140/92 --   02/07/24 2054 98.8 °F (37.1 °C) 87 16 134/72 99 %   02/07/24 1556 98 °F (36.7 °C) 75 16 137/88 98 %   02/07/24 1148 97.9 °F (36.6 °C) (!) 102 18 (!) 131/99 99 %       Labs:  Lab Results   Component Value Date/Time    WBC 9.3 02/05/2024 06:31 PM    HGB 15.6 02/05/2024 06:31 PM    HCT 43.6 02/05/2024 06:31 PM     02/05/2024 06:31 PM    .1 02/05/2024 06:31 PM       Lab Results   Component Value Date/Time     02/05/2024 06:31 PM    K 3.6 02/05/2024 06:31 PM     02/05/2024 06:31 PM    CO2 25 02/05/2024 06:31 PM    BUN 8 02/05/2024 06:31 PM    GLOB 3.9 02/05/2024 06:31 PM    ALT 73 02/05/2024 06:31 PM      Scheduled Meds:   traZODone  100 mg Oral Nightly    sertraline  100 mg Oral Daily    thiamine  100 mg Oral Daily    folic acid  1 mg Oral Daily    multivitamin  1 tablet Oral Daily    LORazepam  1 mg Oral TID    triamcinolone   Topical BID     PRN Meds:  polyethylene glycol, senna, aluminum & magnesium hydroxide-simethicone, hydrOXYzine HCl, haloperidol **OR** haloperidol lactate, diphenhydrAMINE, LORazepam **OR** [DISCONTINUED] diazePAM **OR** LORazepam **OR** [DISCONTINUED] diazePAM **OR** LORazepam **OR** [DISCONTINUED] diazePAM **OR** LORazepam **OR** [DISCONTINUED] diazePAM, ondansetron, loperamide, ibuprofen, cloNIDine    Mental Status Exam:  53 y.o. male in poor grooming, has long hair and is unshaven, dressed in casual attire, well engaged in evaluation.   Makes fair eye contact.  Psychomotor activity notable for restlessness   Speech is normal in volume, tone, output and prosody   Mood is described as \"OK\"   Affect is congruent and dysthymic  Perception is negative for AH/VH  Thought process is logical, linear and goal directed  Thought content is negative for suicidal or homicidal ideation  Alert, awake and oriented in all spheres  Attention/Concentration are intact  Insight and judgment are partial      Assessment:   MDD, recurrent, severe, without psychosis;   unspecified anxiety disorder;   alcohol use disorder, severe    Plan:   2/8/24- Discontinuing trazodone, adding Remeron 15mg QHS for sleep/mood    2/7/24- Increasing Zoloft to 100mg starting tomorrow AM    A coordinated, multidisplinary treatment team round was conducted with the patient, nurses, pharmcist,  and writer present. Discussions held with , and/or with family

## 2024-02-08 NOTE — BH NOTE
PRN Medication Documentation    Specific patient behavior that led to need for PRN medication: c/o anxiety  Staff interventions attempted prior to PRN being given: coping skills  PRN medication given: atarax  Patient response/effectiveness of PRN medication:   Luis Alberto aware

## 2024-02-09 VITALS
RESPIRATION RATE: 19 BRPM | TEMPERATURE: 98.9 F | OXYGEN SATURATION: 100 % | SYSTOLIC BLOOD PRESSURE: 138 MMHG | DIASTOLIC BLOOD PRESSURE: 91 MMHG | BODY MASS INDEX: 21.87 KG/M2 | HEIGHT: 73 IN | WEIGHT: 165 LBS | HEART RATE: 78 BPM

## 2024-02-09 PROCEDURE — 6370000000 HC RX 637 (ALT 250 FOR IP): Performed by: PSYCHIATRY & NEUROLOGY

## 2024-02-09 PROCEDURE — 6370000000 HC RX 637 (ALT 250 FOR IP): Performed by: NURSE PRACTITIONER

## 2024-02-09 RX ORDER — MULTIVITAMIN WITH IRON
1 TABLET ORAL DAILY
Qty: 30 TABLET | Refills: 0 | Status: SHIPPED | OUTPATIENT
Start: 2024-02-10

## 2024-02-09 RX ORDER — SERTRALINE HYDROCHLORIDE 100 MG/1
100 TABLET, FILM COATED ORAL DAILY
Qty: 30 TABLET | Refills: 0 | Status: SHIPPED | OUTPATIENT
Start: 2024-02-10

## 2024-02-09 RX ORDER — MIRTAZAPINE 15 MG/1
15 TABLET, FILM COATED ORAL NIGHTLY
Qty: 30 TABLET | Refills: 0 | Status: SHIPPED | OUTPATIENT
Start: 2024-02-09

## 2024-02-09 RX ORDER — LORAZEPAM 0.5 MG/1
0.5 TABLET ORAL 2 TIMES DAILY
Qty: 4 TABLET | Refills: 0 | Status: SHIPPED | OUTPATIENT
Start: 2024-02-09 | End: 2024-02-11

## 2024-02-09 RX ORDER — HYDROXYZINE 50 MG/1
50 TABLET, FILM COATED ORAL 3 TIMES DAILY PRN
Qty: 30 TABLET | Refills: 0 | Status: SHIPPED | OUTPATIENT
Start: 2024-02-09 | End: 2024-02-19

## 2024-02-09 RX ORDER — LANOLIN ALCOHOL/MO/W.PET/CERES
100 CREAM (GRAM) TOPICAL DAILY
Qty: 30 TABLET | Refills: 0 | Status: SHIPPED | OUTPATIENT
Start: 2024-02-10

## 2024-02-09 RX ADMIN — FOLIC ACID 1 MG: 1 TABLET ORAL at 08:07

## 2024-02-09 RX ADMIN — SERTRALINE HYDROCHLORIDE 100 MG: 50 TABLET ORAL at 08:07

## 2024-02-09 RX ADMIN — HYDROXYZINE HYDROCHLORIDE 50 MG: 25 TABLET, FILM COATED ORAL at 08:09

## 2024-02-09 RX ADMIN — Medication 100 MG: at 08:07

## 2024-02-09 RX ADMIN — LORAZEPAM 0.5 MG: 0.5 TABLET ORAL at 08:08

## 2024-02-09 RX ADMIN — THERA TABS 1 TABLET: TAB at 08:07

## 2024-02-09 RX ADMIN — LORAZEPAM 0.5 MG: 0.5 TABLET ORAL at 13:59

## 2024-02-09 RX ADMIN — CLONIDINE HYDROCHLORIDE 0.1 MG: 0.1 TABLET ORAL at 08:06

## 2024-02-09 NOTE — BH NOTE
Behavioral Health Transition Record to Provider    Patient Name: David Garcia  YOB: 1970   Medical Record Number: [id number]  Date of Admission: 2/5/2024  6:07 PM   Date of Discharge: 2/9/2024    Attending Provider: Sofia Garcia MD   Discharging Provider: DIAZ Kaplan    To contact this individual call [phone] and ask the  to page.  If unavailable, ask to be transferred to Behavioral Health Provider on call.  A Behavioral Health Provider will be available on call 24/7 and during holidays.    Primary Care Provider: Mariluz Ball MD    Allergies   Allergen Reactions    Penicillins     Sulfa Antibiotics        Reason for Admission: CHIEF COMPLAINT: \"I'm just drinking way too much, and the depression and anxiety has gotten really bad.\"      HISTORY OF PRESENTING COMPLAINT:  This is a 53 y.o. male who is currently admitted to the acute psychiatric floor at [facility] on a voluntary basis for suicidal ideation in the context of acute alcohol intoxication. The pt has been drinking about 750ml of vodka daily. He reports he's struggled with alcohol use disorder and depression his whole life. The longest he's maintained recovery from alcohol was 90 days, about a year ago. He does report a hx of DTs and seizures. He has a longstanding hx of depression and anxiety. It was documented in the ED notes that the pt recently attempted to hang himself, but he denies any recent self-harm gestures or suicide attempts. He denies any SI/plan/intent currently, denies HI/plan/intent, endorses VH of shadows and tactile sensation of \"the creepy crawlies\" on his skin. He has been on 50mg Zoloft daily. He is notably restless, but calm, polite and appropriate during evaluation.     Admission Diagnosis: Suicidal ideation [R45.851]  Acute alcoholic intoxication without complication (HCC) [F10.920]  Unspecified mood (affective) disorder (HCC) [F39]    * No surgery found *    Results for orders  your local mental health crisis line number at [facility] at [phone]    Discharge Medication List and Instructions:      Medication List        START taking these medications      hydrOXYzine HCl 50 MG tablet  Commonly known as: ATARAX  Take 1 tablet by mouth 3 times daily as needed for Anxiety     LORazepam 0.5 MG tablet  Commonly known as: ATIVAN  Take 1 tablet by mouth in the morning and 1 tablet in the evening. Do all this for 2 days. Max Daily Amount: 1 mg.     mirtazapine 15 MG tablet  Commonly known as: REMERON  Take 1 tablet by mouth nightly     multivitamin Tabs tablet  Take 1 tablet by mouth daily  Start taking on: February 10, 2024     thiamine 100 MG tablet  Take 1 tablet by mouth daily  Start taking on: February 10, 2024            CHANGE how you take these medications      sertraline 100 MG tablet  Commonly known as: ZOLOFT  Take 1 tablet by mouth daily  Start taking on: February 10, 2024  What changed:   medication strength  how much to take            CONTINUE taking these medications      [place]               Where to Get Your Medications        These medications were sent to [facility] - [place], [place] - [address] [place] [address] - P [phone] - F [phone]  [address] [place] [address]      Phone: [phone]   hydrOXYzine HCl 50 MG tablet  LORazepam 0.5 MG tablet  mirtazapine 15 MG tablet  multivitamin Tabs tablet  sertraline 100 MG tablet  thiamine 100 MG tablet         Unresulted Labs (24h ago, onward)      None            To obtain results of studies pending at discharge, please contact [phone]    Follow-up Information       Follow up With Specialties Details Why Contact Info    Mariluz Ball MD Family Medicine Follow up  [id number] [facility] [address] [phone]      [facility], IOP  Go on 2/13/2024 Go to intake appointment on 2/13 at 3:30PM to set up Intensive Outpatient Program. IOP will meet Monday, Tuesday, and

## 2024-02-09 NOTE — INTERDISCIPLINARY ROUNDS
Behavioral Health Interdisciplinary Rounds     Patient Name: David Garcia  Age: 53 y.o.  Room/Bed:  2/  Primary Diagnosis: Unspecified mood (affective) disorder (HCC)   Admission Status: Voluntary    Readmission within 30 days: No  Power of  in place: No  Patient requires a blocked bed: Yes          Reason for blocked bed: Behavior  Sleep hours:        Participation in Care/Groups:    Medication Compliant?: Yes  PRNS (last 24 hours): Antianxiety, Pain, and Alcohol w/d symptoms, High blood pressure    Restraints (last 24 hours):  No  __________________________________________________  OQ Admission Analysis Survey completed:  OQ Admission Analysis Survey score:  __________________________________________________     Alcohol screening (AUDIT) completed -     If applicable, date SBIRT discussed in treatment team AND documented:    Tobacco - patient is a smoker:    Illegal Drugs use:      24 hour chart check complete: Yes    _______________________________________________    Patient goal(s) for today: Continue with medication regimen and discuss d/c  Treatment team focus/goals: Assess progress and discuss d/c plan  Progress note: Pt met with tx team and reported \"I'm feeling good.\" Pt appeared positive affect and hand tremors decreased. Pt reported he's had a good experience, expressed gratitude for staff on the unit, and feels the \"scary\" part is returning home and continuing with sobriety. Pt reports \"I really feel like I can do it this time.\" SW to set up IOP referral, planned d/c for this afternoon. SW to schedule transportation. Pt will leave with medications in hand.        LOS:  3  Expected LOS: 3    Participating treatment team members: David AMBER Garcia, Pretty LAFLEUR NP, Lisa ARORA, RN, RUTH Suggs Student

## 2024-02-09 NOTE — PROGRESS NOTES
Pharmacist Discharge Medication Reconciliation    Discharging Provider: Pretty Clark NP    Significant PMH: History reviewed. No pertinent past medical history.    Chief Complaint for this Admission:   Chief Complaint   Patient presents with    Suicidal    Alcohol Intoxication     Allergies: Penicillins and Sulfa antibiotics    Discharge Medications:      Medication List        START taking these medications      hydrOXYzine HCl 50 MG tablet  Commonly known as: ATARAX  Take 1 tablet by mouth 3 times daily as needed for Anxiety     LORazepam 0.5 MG tablet  Commonly known as: ATIVAN  Take 1 tablet by mouth in the morning and 1 tablet in the evening. Do all this for 2 days. Max Daily Amount: 1 mg.     mirtazapine 15 MG tablet  Commonly known as: REMERON  Take 1 tablet by mouth nightly     multivitamin Tabs tablet  Take 1 tablet by mouth daily  Start taking on: February 10, 2024     thiamine 100 MG tablet  Take 1 tablet by mouth daily  Start taking on: February 10, 2024            CHANGE how you take these medications      sertraline 100 MG tablet  Commonly known as: ZOLOFT  Take 1 tablet by mouth daily  Start taking on: February 10, 2024  What changed:   medication strength  how much to take            CONTINUE taking these medications      [place]               Where to Get Your Medications        These medications were sent to [facility] - [place], [place] - [address] [place] [address] -  [phone] - F [phone]  [address] [place] [address]      Phone: [phone]   hydrOXYzine HCl 50 MG tablet  LORazepam 0.5 MG tablet  mirtazapine 15 MG tablet  multivitamin Tabs tablet  sertraline 100 MG tablet  thiamine 100 MG tablet       The patient's chart, MAR and AVS were reviewed by Mariel Hall RPH.

## 2024-02-09 NOTE — BH NOTE
[facility]  PSYCHIATRIC PROGRESS NOTE    Patient: David Garcia MRN: [id number]  SSN: [id number]    YOB: 1970  Age: 53 y.o.  Sex: male      Admit Date: 2/5/2024    Length of Stay: 3 Days    Legal Status: Voluntary    Chief Complaint: \"I'm much better.\"     Interval History:   2/9/24- David is again doing well. He feels he is through the worst of his withdrawals and denies any current withdrawal symptoms. He did not require any PRN Ativan at all yesterday. His affect is bright and mood is stable. Denies any SI/plan/intent, HI/plan/intent, AH or VH. Calm, pleasant, and cooperative. States he appreciates the care he has gotten here and he is looking forward to being sober. He is very committed to his recovery and feels safe to discharge.     2/8/24- David is doing better. He feels better physically and mentally, and is eating more. He denies any SI/plan/intent, denies HI/plan/intent, denies AH/VH. He did not sleep well, however, and felt the trazodone caused restlessness. Mood is good today, and affect remains bright. He has continued to require extra Ativan for his CIWA scores - including scheduled 1mg TID, he had a total of 6mg Ativan yesterday. He is discharge focused, asking when he can be discharged, but did verbalize understanding of the risks of withdrawal and verbalized understanding that it is not safe to go home while still detoxing, and that stopping Ativan abruptly could put him at risk for further adverse effects such as seizures.     2/7/24- CIWA was 15 last night, then 8 and then 5 this morning. He continues to struggle with his detox symptoms, reports severe sweating at night, tremors, still feels \"wobbly,\" BP has been elevated. He has been eating some, not as much as he should but he is eating some from each meal, and is working on hydrating. Sleep was very poor last night. He denies any SI/HI, is not currently endorsing any AH or VH today.     Vitals:  Patient Vitals for the

## 2024-02-09 NOTE — PLAN OF CARE
Problem: Sleep Disturbance  Goal: Will exhibit normal sleeping pattern  Description: INTERVENTIONS:  1. Administer medication as ordered  2. Decrease environmental stimuli, including noise, as appropriate  3. Discourage social isolation and naps during the day  2/9/2024 0854 by Lisa Terrell, RN  Outcome: Not Progressing  2/8/2024 2351 by Chioma Christopher RN  Outcome: Progressing     Problem: Behavior  Goal: Pt/Family maintain appropriate behavior and adhere to behavioral management agreement, if implemented  Description: INTERVENTIONS:  1. Assess patient/family's coping skills and  non-compliant behavior (including use of illegal substances)  2. Notify security of behavior or suspected illegal substances which indicate the need for search of the family and/or belongings  3. Encourage verbalization of thoughts and concerns in a socially appropriate manner  4. Utilize positive, consistent limit setting strategies supporting safety of patient, staff and others  5. Encourage participation in the decision making process about the behavioral management agreement  6. If a visitor's behavior poses a threat to safety call refer to organization policy.  7. Initiate consult with , Psychosocial CNS, Spiritual Care as appropriate  2/8/2024 2351 by Chioma Christopher, EVETTE  Outcome: Progressing     Problem: Drug Abuse/Detox  Goal: Will have no detox symptoms and will verbalize plan for changing drug-related behavior  Description: INTERVENTIONS:  1. Administer medication as ordered  2. Monitor physical status  3. Provide emotional support with 1:1 interaction with staff  4. Encourage  recovery focused treatment   Outcome: Progressing  Flowsheets (Taken 2/9/2024 0806)  Will have no detox symptoms and will verbalize plan for changing drug-related behavior: Provide emotional support with 1:1 interaction with staff     Problem: Sleep Disturbance  Goal: Will exhibit normal sleeping pattern  Description:

## 2024-02-09 NOTE — PLAN OF CARE
Patient received lying quietly in bed, with breathing even and unlabored. David is in NAD at this time, monitored with visual checks by nursing staff for safety. Q15 minute checks in place.    Problem: Behavior  Goal: Pt/Family maintain appropriate behavior and adhere to behavioral management agreement, if implemented  Description: INTERVENTIONS:  1. Assess patient/family's coping skills and  non-compliant behavior (including use of illegal substances)  2. Notify security of behavior or suspected illegal substances which indicate the need for search of the family and/or belongings  3. Encourage verbalization of thoughts and concerns in a socially appropriate manner  4. Utilize positive, consistent limit setting strategies supporting safety of patient, staff and others  5. Encourage participation in the decision making process about the behavioral management agreement  6. If a visitor's behavior poses a threat to safety call refer to organization policy.  7. Initiate consult with , Psychosocial CNS, Spiritual Care as appropriate  Outcome: Progressing     Problem: Sleep Disturbance  Goal: Will exhibit normal sleeping pattern  Description: INTERVENTIONS:  1. Administer medication as ordered  2. Decrease environmental stimuli, including noise, as appropriate  3. Discourage social isolation and naps during the day  Outcome: Progressing     Problem: Pain  Goal: Verbalizes/displays adequate comfort level or baseline comfort level  Outcome: Progressing

## 2024-02-09 NOTE — BH NOTE
Patient is future focused and ready to discharge. Discharge instructions gone over with patient with opportunity for questions to be answered and clarified. Personal belongings, prescribed medications send with patient. Patient escorted with  staff to [facility]

## 2024-02-09 NOTE — DISCHARGE SUMMARY
DISCHARGE SUMMARY  Some parts of the discharge summary are from the initial Psychiatric interview that was done on admission by the admitting psychiatrist.     Name: David Garcia  MR#: [id number]  Account#: [id number]  : 1970  Date of Admission: 2024    Date of Discharge: 2024     TYPE OF DISCHARGE:   REGULAR     INITIAL PSYCHIATRIC INTERVIEW:   CHIEF COMPLAINT: \"I'm just drinking way too much, and the depression and anxiety has gotten really bad.\"      HISTORY OF PRESENTING COMPLAINT:  This is a 53 y.o. male who is currently admitted to the acute psychiatric floor at [facility] on a voluntary basis for suicidal ideation in the context of acute alcohol intoxication. The pt has been drinking about 750ml of vodka daily. He reports he's struggled with alcohol use disorder and depression his whole life. The longest he's maintained recovery from alcohol was 90 days, about a year ago. He does report a hx of DTs and seizures. He has a longstanding hx of depression and anxiety. It was documented in the ED notes that the pt recently attempted to hang himself, but he denies any recent self-harm gestures or suicide attempts. He denies any SI/plan/intent currently, denies HI/plan/intent, endorses VH of shadows and tactile sensation of \"the creepy crawlies\" on his skin. He has been on 50mg Zoloft daily. He is notably restless, but calm, polite and appropriate during evaluation.      PAST PSYCHIATRIC HISTORY: The pt has a hx of detox-focused admissions, but no other psychiatric admissions. He denies any hx of suicide attempts. He does not have a psychiatrist or therapist currently.      PAST MEDICATION TRIALS:   Buspar - prescribed but never took  Doxepin - prescribed but never took  Zoloft   Librium  Ativan     SUBSTANCE ABUSE HISTORY:  750ml liquor daily - hx of 2 withdrawal seizures. Has done outpatient treatment before at [facility] and has done detox at [facility]. Longest period of recovery  self or others arise. Patient is future oriented, identifies reasons for living and goals for the future, and is not felt to be an immediate risk to self or others. Patient will follow-up with appointments and remains motivated to be in treatment. The patient verbalized understanding of the above discharge instructions.     2/9/24- David is again doing well. He feels he is through the worst of his withdrawals and denies any current withdrawal symptoms. He did not require any PRN Ativan at all yesterday. His affect is bright and mood is stable. Denies any SI/plan/intent, HI/plan/intent, AH or VH. Calm, pleasant, and cooperative. States he appreciates the care he has gotten here and he is looking forward to being sober. He is very committed to his recovery and feels safe to discharge.      2/8/24- David is doing better. He feels better physically and mentally, and is eating more. He denies any SI/plan/intent, denies HI/plan/intent, denies AH/VH. He did not sleep well, however, and felt the trazodone caused restlessness. Mood is good today, and affect remains bright. He has continued to require extra Ativan for his CIWA scores - including scheduled 1mg TID, he had a total of 6mg Ativan yesterday. He is discharge focused, asking when he can be discharged, but did verbalize understanding of the risks of withdrawal and verbalized understanding that it is not safe to go home while still detoxing, and that stopping Ativan abruptly could put him at risk for further adverse effects such as seizures.      2/7/24- CIWA was 15 last night, then 8 and then 5 this morning. He continues to struggle with his detox symptoms, reports severe sweating at night, tremors, still feels \"wobbly,\" BP has been elevated. He has been eating some, not as much as he should but he is eating some from each meal, and is working on hydrating. Sleep was very poor last night. He denies any SI/HI, is not currently endorsing any AH or VH today.

## 2024-12-09 ENCOUNTER — HOSPITAL ENCOUNTER (EMERGENCY)
Facility: HOSPITAL | Age: 54
Discharge: HOME | End: 2024-12-10
Attending: EMERGENCY MEDICINE
Payer: COMMERCIAL

## 2024-12-09 DIAGNOSIS — K76.0 HEPATIC STEATOSIS: ICD-10-CM

## 2024-12-09 DIAGNOSIS — R74.01 TRANSAMINITIS: ICD-10-CM

## 2024-12-09 DIAGNOSIS — R45.851 SUICIDAL IDEATIONS: ICD-10-CM

## 2024-12-09 DIAGNOSIS — F10.90 ALCOHOL USE DISORDER: Primary | ICD-10-CM

## 2024-12-09 LAB
ALBUMIN SERPL-MCNC: 3.5 G/DL (ref 3.5–5)
ALBUMIN/GLOB SERPL: 0.9 (ref 1.1–2.2)
ALP SERPL-CCNC: 538 U/L (ref 45–117)
ALT SERPL-CCNC: 126 U/L (ref 12–78)
AMPHET UR QL SCN: NEGATIVE
ANION GAP SERPL CALC-SCNC: 9 MMOL/L (ref 2–12)
APAP SERPL-MCNC: <2 UG/ML (ref 10–30)
AST SERPL-CCNC: 476 U/L (ref 15–37)
BARBITURATES UR QL SCN: NEGATIVE
BASOPHILS # BLD: 0.1 K/UL (ref 0–0.1)
BASOPHILS NFR BLD: 1 % (ref 0–1)
BENZODIAZ UR QL: NEGATIVE
BILIRUB SERPL-MCNC: 0.8 MG/DL (ref 0.2–1)
BUN SERPL-MCNC: 8 MG/DL (ref 6–20)
BUN/CREAT SERPL: 11 (ref 12–20)
CALCIUM SERPL-MCNC: 8.8 MG/DL (ref 8.5–10.1)
CANNABINOIDS UR QL SCN: POSITIVE
CHLORIDE SERPL-SCNC: 106 MMOL/L (ref 97–108)
CO2 SERPL-SCNC: 21 MMOL/L (ref 21–32)
COCAINE UR QL SCN: NEGATIVE
COMMENT:: NORMAL
CREAT SERPL-MCNC: 0.7 MG/DL (ref 0.7–1.3)
DIFFERENTIAL METHOD BLD: ABNORMAL
EOSINOPHIL # BLD: 0.1 K/UL (ref 0–0.4)
EOSINOPHIL NFR BLD: 1 % (ref 0–7)
ERYTHROCYTE [DISTWIDTH] IN BLOOD BY AUTOMATED COUNT: 19 % (ref 11.5–14.5)
ETHANOL SERPL-MCNC: 337 MG/DL (ref 0–0.08)
GLOBULIN SER CALC-MCNC: 3.7 G/DL (ref 2–4)
GLUCOSE SERPL-MCNC: 152 MG/DL (ref 65–100)
HCT VFR BLD AUTO: 41.9 % (ref 36.6–50.3)
HGB BLD-MCNC: 14.8 G/DL (ref 12.1–17)
IMM GRANULOCYTES # BLD AUTO: 0 K/UL (ref 0–0.04)
IMM GRANULOCYTES NFR BLD AUTO: 0 % (ref 0–0.5)
LYMPHOCYTES # BLD: 2.1 K/UL (ref 0.8–3.5)
LYMPHOCYTES NFR BLD: 34 % (ref 12–49)
Lab: ABNORMAL
MCH RBC QN AUTO: 36.1 PG (ref 26–34)
MCHC RBC AUTO-ENTMCNC: 35.3 G/DL (ref 30–36.5)
MCV RBC AUTO: 102.2 FL (ref 80–99)
METHADONE UR QL: NEGATIVE
MONOCYTES # BLD: 0.6 K/UL (ref 0–1)
MONOCYTES NFR BLD: 10 % (ref 5–13)
NEUTS SEG # BLD: 3.3 K/UL (ref 1.8–8)
NEUTS SEG NFR BLD: 54 % (ref 32–75)
NRBC # BLD: 0 K/UL (ref 0–0.01)
NRBC BLD-RTO: 0 PER 100 WBC
OPIATES UR QL: NEGATIVE
PCP UR QL: NEGATIVE
PLATELET # BLD AUTO: 319 K/UL (ref 150–400)
PMV BLD AUTO: 9.4 FL (ref 8.9–12.9)
POTASSIUM SERPL-SCNC: 3.7 MMOL/L (ref 3.5–5.1)
PROT SERPL-MCNC: 7.2 G/DL (ref 6.4–8.2)
RBC # BLD AUTO: 4.1 M/UL (ref 4.1–5.7)
SALICYLATES SERPL-MCNC: 4.1 MG/DL (ref 2.8–20)
SODIUM SERPL-SCNC: 136 MMOL/L (ref 136–145)
SPECIMEN HOLD: NORMAL
SPECIMEN HOLD: NORMAL
WBC # BLD AUTO: 6.1 K/UL (ref 4.1–11.1)

## 2024-12-09 PROCEDURE — 2580000003 HC RX 258: Performed by: EMERGENCY MEDICINE

## 2024-12-09 PROCEDURE — 99285 EMERGENCY DEPT VISIT HI MDM: CPT

## 2024-12-09 PROCEDURE — 85025 COMPLETE CBC W/AUTO DIFF WBC: CPT

## 2024-12-09 PROCEDURE — 36415 COLL VENOUS BLD VENIPUNCTURE: CPT

## 2024-12-09 PROCEDURE — 82077 ASSAY SPEC XCP UR&BREATH IA: CPT

## 2024-12-09 PROCEDURE — 80053 COMPREHEN METABOLIC PANEL: CPT

## 2024-12-09 PROCEDURE — 80179 DRUG ASSAY SALICYLATE: CPT

## 2024-12-09 PROCEDURE — 80143 DRUG ASSAY ACETAMINOPHEN: CPT

## 2024-12-09 PROCEDURE — 80307 DRUG TEST PRSMV CHEM ANLYZR: CPT

## 2024-12-09 PROCEDURE — 6370000000 HC RX 637 (ALT 250 FOR IP): Performed by: EMERGENCY MEDICINE

## 2024-12-09 RX ORDER — LANOLIN ALCOHOL/MO/W.PET/CERES
100 CREAM (GRAM) TOPICAL DAILY
Status: DISCONTINUED | OUTPATIENT
Start: 2024-12-09 | End: 2024-12-10 | Stop reason: HOSPADM

## 2024-12-09 RX ORDER — 0.9 % SODIUM CHLORIDE 0.9 %
1000 INTRAVENOUS SOLUTION INTRAVENOUS ONCE
Status: COMPLETED | OUTPATIENT
Start: 2024-12-09 | End: 2024-12-09

## 2024-12-09 RX ORDER — LORAZEPAM 1 MG/1
1 TABLET ORAL
Status: COMPLETED | OUTPATIENT
Start: 2024-12-09 | End: 2024-12-09

## 2024-12-09 RX ORDER — PHENOBARBITAL 32.4 MG/1
64.8 TABLET ORAL 4 TIMES DAILY
Status: DISCONTINUED | OUTPATIENT
Start: 2024-12-09 | End: 2024-12-10

## 2024-12-09 RX ORDER — ACETAMINOPHEN 500 MG
1000 TABLET ORAL
Status: DISCONTINUED | OUTPATIENT
Start: 2024-12-09 | End: 2024-12-09

## 2024-12-09 RX ORDER — FOLIC ACID 1 MG/1
1 TABLET ORAL DAILY
Status: DISCONTINUED | OUTPATIENT
Start: 2024-12-09 | End: 2024-12-10 | Stop reason: HOSPADM

## 2024-12-09 RX ORDER — PHENOBARBITAL 32.4 MG/1
16.2 TABLET ORAL EVERY 6 HOURS PRN
Status: DISCONTINUED | OUTPATIENT
Start: 2024-12-12 | End: 2024-12-10

## 2024-12-09 RX ORDER — SODIUM CHLORIDE 0.9 % (FLUSH) 0.9 %
5-40 SYRINGE (ML) INJECTION PRN
Status: DISCONTINUED | OUTPATIENT
Start: 2024-12-09 | End: 2024-12-10 | Stop reason: HOSPADM

## 2024-12-09 RX ORDER — IBUPROFEN 400 MG/1
600 TABLET, FILM COATED ORAL
Status: COMPLETED | OUTPATIENT
Start: 2024-12-09 | End: 2024-12-09

## 2024-12-09 RX ORDER — PHENOBARBITAL 32.4 MG/1
32.4 TABLET ORAL 4 TIMES DAILY
Status: DISCONTINUED | OUTPATIENT
Start: 2024-12-10 | End: 2024-12-10

## 2024-12-09 RX ORDER — SODIUM CHLORIDE 9 MG/ML
INJECTION, SOLUTION INTRAVENOUS PRN
Status: DISCONTINUED | OUTPATIENT
Start: 2024-12-09 | End: 2024-12-10 | Stop reason: HOSPADM

## 2024-12-09 RX ORDER — PHENOBARBITAL 32.4 MG/1
64.8 TABLET ORAL EVERY 6 HOURS PRN
Status: DISCONTINUED | OUTPATIENT
Start: 2024-12-09 | End: 2024-12-10

## 2024-12-09 RX ORDER — PHENOBARBITAL 32.4 MG/1
32.4 TABLET ORAL 2 TIMES DAILY
Status: DISCONTINUED | OUTPATIENT
Start: 2024-12-11 | End: 2024-12-10

## 2024-12-09 RX ORDER — PHENOBARBITAL 32.4 MG/1
32.4 TABLET ORAL EVERY 6 HOURS PRN
Status: DISCONTINUED | OUTPATIENT
Start: 2024-12-10 | End: 2024-12-10

## 2024-12-09 RX ORDER — PHENOBARBITAL 32.4 MG/1
16.2 TABLET ORAL 2 TIMES DAILY
Status: DISCONTINUED | OUTPATIENT
Start: 2024-12-12 | End: 2024-12-10

## 2024-12-09 RX ORDER — MULTIVITAMIN WITH IRON
1 TABLET ORAL DAILY
Status: DISCONTINUED | OUTPATIENT
Start: 2024-12-09 | End: 2024-12-10 | Stop reason: HOSPADM

## 2024-12-09 RX ORDER — SODIUM CHLORIDE 0.9 % (FLUSH) 0.9 %
5-40 SYRINGE (ML) INJECTION EVERY 12 HOURS SCHEDULED
Status: DISCONTINUED | OUTPATIENT
Start: 2024-12-09 | End: 2024-12-10 | Stop reason: HOSPADM

## 2024-12-09 RX ADMIN — Medication 1 MG: at 20:39

## 2024-12-09 RX ADMIN — PHENOBARBITAL 64.8 MG: 32.4 TABLET ORAL at 20:38

## 2024-12-09 RX ADMIN — THERA TABS 1 TABLET: TAB at 20:38

## 2024-12-09 RX ADMIN — Medication 100 MG: at 20:38

## 2024-12-09 RX ADMIN — IBUPROFEN 600 MG: 400 TABLET, FILM COATED ORAL at 22:22

## 2024-12-09 RX ADMIN — LORAZEPAM 1 MG: 1 TABLET ORAL at 22:51

## 2024-12-09 RX ADMIN — SODIUM CHLORIDE 1000 ML: 9 INJECTION, SOLUTION INTRAVENOUS at 22:23

## 2024-12-09 ASSESSMENT — PAIN SCALES - GENERAL: PAINLEVEL_OUTOF10: 9

## 2024-12-09 ASSESSMENT — PAIN - FUNCTIONAL ASSESSMENT
PAIN_FUNCTIONAL_ASSESSMENT: NONE - DENIES PAIN
PAIN_FUNCTIONAL_ASSESSMENT: ACTIVITIES ARE NOT PREVENTED

## 2024-12-09 ASSESSMENT — PAIN DESCRIPTION - LOCATION: LOCATION: HEAD;KNEE

## 2024-12-09 ASSESSMENT — PAIN DESCRIPTION - ORIENTATION: ORIENTATION: RIGHT;LEFT

## 2024-12-09 ASSESSMENT — PAIN DESCRIPTION - DESCRIPTORS: DESCRIPTORS: ACHING

## 2024-12-10 ENCOUNTER — APPOINTMENT (OUTPATIENT)
Facility: HOSPITAL | Age: 54
End: 2024-12-10
Payer: COMMERCIAL

## 2024-12-10 VITALS
HEIGHT: 73 IN | WEIGHT: 167.99 LBS | DIASTOLIC BLOOD PRESSURE: 73 MMHG | HEART RATE: 76 BPM | OXYGEN SATURATION: 100 % | SYSTOLIC BLOOD PRESSURE: 133 MMHG | BODY MASS INDEX: 22.26 KG/M2 | TEMPERATURE: 98.8 F | RESPIRATION RATE: 16 BRPM

## 2024-12-10 LAB — ETHANOL SERPL-MCNC: 131 MG/DL (ref 0–0.08)

## 2024-12-10 PROCEDURE — 36415 COLL VENOUS BLD VENIPUNCTURE: CPT

## 2024-12-10 PROCEDURE — 2580000003 HC RX 258: Performed by: EMERGENCY MEDICINE

## 2024-12-10 PROCEDURE — 76705 ECHO EXAM OF ABDOMEN: CPT

## 2024-12-10 PROCEDURE — 82077 ASSAY SPEC XCP UR&BREATH IA: CPT

## 2024-12-10 PROCEDURE — 96375 TX/PRO/DX INJ NEW DRUG ADDON: CPT

## 2024-12-10 PROCEDURE — 6370000000 HC RX 637 (ALT 250 FOR IP): Performed by: NURSE PRACTITIONER

## 2024-12-10 PROCEDURE — 6360000002 HC RX W HCPCS: Performed by: STUDENT IN AN ORGANIZED HEALTH CARE EDUCATION/TRAINING PROGRAM

## 2024-12-10 PROCEDURE — 96374 THER/PROPH/DIAG INJ IV PUSH: CPT

## 2024-12-10 PROCEDURE — 6370000000 HC RX 637 (ALT 250 FOR IP): Performed by: STUDENT IN AN ORGANIZED HEALTH CARE EDUCATION/TRAINING PROGRAM

## 2024-12-10 PROCEDURE — 6370000000 HC RX 637 (ALT 250 FOR IP): Performed by: EMERGENCY MEDICINE

## 2024-12-10 PROCEDURE — 96376 TX/PRO/DX INJ SAME DRUG ADON: CPT

## 2024-12-10 RX ORDER — MIRTAZAPINE 15 MG/1
15 TABLET, FILM COATED ORAL NIGHTLY
Status: DISCONTINUED | OUTPATIENT
Start: 2024-12-10 | End: 2024-12-10 | Stop reason: HOSPADM

## 2024-12-10 RX ORDER — HYDROXYZINE HYDROCHLORIDE 25 MG/1
50 TABLET, FILM COATED ORAL ONCE
Status: COMPLETED | OUTPATIENT
Start: 2024-12-10 | End: 2024-12-10

## 2024-12-10 RX ORDER — CHLORDIAZEPOXIDE HYDROCHLORIDE 25 MG/1
50 CAPSULE, GELATIN COATED ORAL EVERY 6 HOURS PRN
Status: DISCONTINUED | OUTPATIENT
Start: 2024-12-10 | End: 2024-12-10 | Stop reason: HOSPADM

## 2024-12-10 RX ORDER — PHENOBARBITAL SODIUM 65 MG/ML
130 INJECTION, SOLUTION INTRAMUSCULAR; INTRAVENOUS ONCE
Status: COMPLETED | OUTPATIENT
Start: 2024-12-10 | End: 2024-12-10

## 2024-12-10 RX ORDER — LORAZEPAM 2 MG/ML
2 INJECTION INTRAMUSCULAR
Status: DISCONTINUED | OUTPATIENT
Start: 2024-12-10 | End: 2024-12-10 | Stop reason: HOSPADM

## 2024-12-10 RX ORDER — LORAZEPAM 1 MG/1
1 TABLET ORAL
Status: DISCONTINUED | OUTPATIENT
Start: 2024-12-10 | End: 2024-12-10 | Stop reason: HOSPADM

## 2024-12-10 RX ORDER — PHENOBARBITAL SODIUM 65 MG/ML
200 INJECTION, SOLUTION INTRAMUSCULAR; INTRAVENOUS ONCE
Status: COMPLETED | OUTPATIENT
Start: 2024-12-10 | End: 2024-12-10

## 2024-12-10 RX ORDER — LORAZEPAM 1 MG/1
3 TABLET ORAL
Status: DISCONTINUED | OUTPATIENT
Start: 2024-12-10 | End: 2024-12-10 | Stop reason: HOSPADM

## 2024-12-10 RX ORDER — LORAZEPAM 1 MG/1
2 TABLET ORAL
Status: DISCONTINUED | OUTPATIENT
Start: 2024-12-10 | End: 2024-12-10 | Stop reason: HOSPADM

## 2024-12-10 RX ORDER — IBUPROFEN 400 MG/1
600 TABLET, FILM COATED ORAL
Status: COMPLETED | OUTPATIENT
Start: 2024-12-10 | End: 2024-12-10

## 2024-12-10 RX ORDER — LORAZEPAM 2 MG/ML
4 INJECTION INTRAMUSCULAR
Status: DISCONTINUED | OUTPATIENT
Start: 2024-12-10 | End: 2024-12-10 | Stop reason: HOSPADM

## 2024-12-10 RX ORDER — LORAZEPAM 2 MG/ML
3 INJECTION INTRAMUSCULAR
Status: DISCONTINUED | OUTPATIENT
Start: 2024-12-10 | End: 2024-12-10 | Stop reason: HOSPADM

## 2024-12-10 RX ORDER — LORAZEPAM 1 MG/1
4 TABLET ORAL
Status: DISCONTINUED | OUTPATIENT
Start: 2024-12-10 | End: 2024-12-10 | Stop reason: HOSPADM

## 2024-12-10 RX ORDER — LORAZEPAM 2 MG/ML
1 INJECTION INTRAMUSCULAR
Status: DISCONTINUED | OUTPATIENT
Start: 2024-12-10 | End: 2024-12-10 | Stop reason: HOSPADM

## 2024-12-10 RX ORDER — HYDROXYZINE HYDROCHLORIDE 25 MG/1
50 TABLET, FILM COATED ORAL 3 TIMES DAILY PRN
Status: DISCONTINUED | OUTPATIENT
Start: 2024-12-10 | End: 2024-12-10 | Stop reason: HOSPADM

## 2024-12-10 RX ADMIN — PHENOBARBITAL SODIUM 200 MG: 65 INJECTION INTRAMUSCULAR at 05:13

## 2024-12-10 RX ADMIN — LORAZEPAM 2 MG: 2 INJECTION INTRAMUSCULAR; INTRAVENOUS at 12:22

## 2024-12-10 RX ADMIN — THERA TABS 1 TABLET: TAB at 12:24

## 2024-12-10 RX ADMIN — IBUPROFEN 600 MG: 400 TABLET, FILM COATED ORAL at 06:13

## 2024-12-10 RX ADMIN — LORAZEPAM 3 MG: 2 INJECTION INTRAMUSCULAR; INTRAVENOUS at 06:17

## 2024-12-10 RX ADMIN — Medication 100 MG: at 12:24

## 2024-12-10 RX ADMIN — HYDROXYZINE HYDROCHLORIDE 50 MG: 25 TABLET ORAL at 06:12

## 2024-12-10 RX ADMIN — SERTRALINE HYDROCHLORIDE 50 MG: 50 TABLET ORAL at 14:05

## 2024-12-10 RX ADMIN — LORAZEPAM 3 MG: 2 INJECTION INTRAMUSCULAR; INTRAVENOUS at 14:01

## 2024-12-10 RX ADMIN — SODIUM CHLORIDE, PRESERVATIVE FREE 10 ML: 5 INJECTION INTRAVENOUS at 12:24

## 2024-12-10 RX ADMIN — PHENOBARBITAL SODIUM 130 MG: 65 INJECTION INTRAMUSCULAR at 01:32

## 2024-12-10 RX ADMIN — Medication 1 MG: at 12:24

## 2024-12-10 ASSESSMENT — PAIN SCALES - GENERAL
PAINLEVEL_OUTOF10: 0
PAINLEVEL_OUTOF10: 0

## 2024-12-10 NOTE — ED TRIAGE NOTES
\"I have major depression. And Sweta been drinking like a crazy person. And I ran out of my zoloft\" pt arrives with friend who states patient had a plan to walk out in front of cars. \"My depression has gotten so bad that I can barely wake up in the morning.\" Pt reports drinking about 1/2 gallon and two pints of vodka a day. Pt reports last drink was about 30 minutes ago which was beer.

## 2024-12-10 NOTE — CONSULTS
[facility]  PSYCHIATRY CONSULT NOTE:    Name: David Garcia  MR#: [id number]  : 1970  ACCOUNT#: [id number]  ADMIT DATE: 2024    REASON FOR CONSULT: SI    HISTORY OF PRESENTING COMPLAINT:  David Garcia is a 54 y.o. male with who is currently experiencing suicidal thoughts prior to admission.  Currently in the emergency department and feeling better and that he can be discharged home with good resources as well as going to rehab.  Reports he does acknowledge most of his symptoms like they are coming from his addiction.  Psychiatry was initially consulted to assess safety level of being discharged versus staying and he was given clearance to be discharged with safety plan by practitioner.    I still went to see him as requested by B smart to address medications he has taken previously that were very helpful for anxiety and depression and poor sleep.  He has done well on Zoloft, mirtazapine, and as needed hydroxyzine previously he reports to me.    Of note he was discharged on hydroxyzine 50 mg 3 times daily as needed for anxiety and mirtazapine 15 mg tablet at bedtime for anxiety depression and insomnia.    He has already called the rehabilitation facility that he would attend for about \"a week or 2 weeks\".    He is appropriate and pleasant during my interview with him and easily arousable and closed eyes. Has disheveled despondent presentation telling me I just feel horrible.  He is referring physically to withdrawal symptoms and concerned because he took high levels of kratom at home and is worried he will start going into withdrawal but he is calling the rehabilitation center also was advised dilated when sure they do help with detox and may request him to be detoxed prior to admission.  No current thoughts of suicide, homicide, or having any audiovisual hallucinations paranoia or delusional thought process.     PAST PSYCHIATRIC HISTORY: Last hospitalization at this facility 1 year

## 2024-12-10 NOTE — ED PROVIDER NOTES
ED SIGN OUT NOTE  Care assumed at [facility] 11:21 PM EST    Patient was signed out to me by Dr. Terry.     Patient signed out pending lab results, re-evaluation, and disposition      /85   Pulse 65   Temp 97.6 °F (36.4 °C) (Oral)   Resp 16   Ht 1.854 m (6' 1\")   Wt 76.2 kg (167 lb 15.9 oz)   SpO2 95%   BMI 22.16 kg/m²   Labs Reviewed   ACETAMINOPHEN LEVEL - Abnormal; Notable for the following components:       Result Value    Acetaminophen Level <2 (*)     All other components within normal limits   CBC WITH AUTO DIFFERENTIAL - Abnormal; Notable for the following components:    .2 (*)     MCH 36.1 (*)     RDW 19.0 (*)     All other components within normal limits   COMPREHENSIVE METABOLIC PANEL - Abnormal; Notable for the following components:    Glucose 152 (*)     BUN/Creatinine Ratio 11 (*)      (*)      (*)     Alk Phosphatase 538 (*)     Albumin/Globulin Ratio 0.9 (*)     All other components within normal limits   ETHANOL - Abnormal; Notable for the following components:    Ethanol Lvl 337 (*)     All other components within normal limits   URINE DRUG SCREEN - Abnormal; Notable for the following components:    THC, TH-Cannabinol, Urine Positive (*)     All other components within normal limits   ETHANOL - Abnormal; Notable for the following components:    Ethanol Lvl 131 (*)     All other components within normal limits   URINE CULTURE HOLD SAMPLE   SALICYLATE LEVEL   EXTRA TUBES HOLD     US ABDOMEN LIMITED   Final Result   Enlarged echogenic liver consistent with hepatic steatosis. Status   post cholecystectomy. Otherwise unremarkable.            Electronically signed by TRISTEN GIL        ED Course as of 12/10/24 1024   Tu Dec 10, 2024   0556 Patient without RUQ pain or tenderness, liver enzymes elevated but US showing no acute changes. Suspect secondary to ETOH abuse, hepatic steatosis on US. Will eventually need GI follow up but no acute issue today.

## 2024-12-10 NOTE — ED NOTES
Patient signed out to me.  Hx of drug/etoh abuse. ?withdrawal. Plan for possible BHU placement.      Seen by bsmart, psychiatry. Plan for dc home. Has appt tomorrow with RB for withdrawal management. Shama of psychiatry to write rx's for pt. Can go home per them following this.     Upon my assessment he has no SI. He does not appear to be actively withdrawaing. He is in no acute distress.   I reiterated the importance of him NOT drinking etoh tonight and to take meds as prescribed.      Brad Nolasco, DO  12/10/24 1415       Brad Nolasco, DO  12/10/24 1514       Brad Nolasco, DO  12/10/24 1536

## 2024-12-10 NOTE — ED NOTES
Bedside shift change report given to Dariel RN (oncoming nurse) by Raymond RN (offgoing nurse). Report included the following information MAR and Recent Results.

## 2024-12-10 NOTE — BSMART NOTE
BSMART Liaison Team Note     LOS:  13 hours     Patient goal(s) for today: take medications as prescribed, make needs known in an appropriate manner, use coping skills  BSMART Liaison team focus/goals: assess needs, provide support and education    Progress note: Pt came to ED 12/9/24 c/o SI with plan to walk into traffic, Pt reported being off his zoloft and self medicating with alcohol. BSMART assessed Pt, recommended psychiatric admission and Pt is in agreement. Psychiatry consult needed d/t SI and being a BH hold waiting BHU bed placement.     Liaison met with Pt face to face in ED with 1:1 at bedside. He appeared disheveled and unkempt. Pt was lethargic, oriented x4 and calm. He struggled to stay alert during assessment, closing his eyes and dosing off frequently. He denied issues with sleep and appetite. He attempted to engaged and answer questions in a soft slow voice. He reported sever depression and anxiety. He denied SI, HI and AVH, stating \"I just had it... I never wanted to die I just didn't care if I lived or not\" and reported now he \"wants to live.\" Pt thoughts were clear and free from delusions or hallucinations. Pt remains agreeable to BHU admission stating he wants Tx for anxiety and depression \"for real.\" Liaison reviewed bed search process and psychiatry consult services available in ED. Pt verbalized understanding and denied any questions or concerns at this time.     Barriers to Discharge: psychiatric & medical (CIWAs)    Outpatient provider(s):  none reported  Insurance info/prescription coverage:  [facility]     Diagnosis: Major Depressive disorder & Alcohol dependence disorder     Plan:  BSMART clinician conducting voluntary U bed search; Pt in agreement. Refer to .psychiatric consult note for further assessment and recommendation. BSMART liaison team following or support.    Follow up Psych Consult placed? No - liaison requested psych consult be ordered  Psychiatrist 
BSMART assessment completed, and suicide risk level noted to be Moderate Risk and 1:1 sitter recommended  Primary Nurse Raymond and Physician Kayla Terry  notified. Concerns not observed. Pt requesting admission into U during this time.Pt being recommended for voluntary inpatient hospitalization during this time.       
Per bed board, NP Pretty Clark is recommending discharge with substance use referrals.     Met with patient face to face. Pt denies SI/HI/AHVH and reports feeling safe with himself, but is concerned about his withdrawals. Pt reports feeling anxious due to withdrawals. Pt shared he drinks to mask his depression and since he has been off the Zoloft he's experienced increased depression. Pt would like to restart this medication as it has worked for him in the past. Discussed with patient referral to Withdrawal Management program through [facility]. Pt is agreeable to this.     Spoke with [facility]drawal [facility]. Per clinician, pt can call today for intake appointment for available bed tomorrow.     Pt provided number for intake [phone] and will complete assessment/intake via telephone.     Spoke with Shama Mayorga NP for potential medication adjustments due to IP Psychiatry consult being ordered. She will see patient today.     Left voicemail for Pretty Clark NP.     11:50AM: Patient completed telephone call with [facility] Withdrawal Management and will go to the facility tomorrow at 9AM. Pt remains feeling anxious due to withdrawals, notified MD Dr. Nolasco. Pt awaiting to speak with psychiatric NP for medication adjustments.    Spoke with psychiatric NP Shama Mayorga who is agreeable to discharge and medication adjustments. Pt continues to deny SI/HI/AHVH. He reports using Kratom before too, MD notified. Pt continues to feel anxious from the withdrawals, but feels safe discharging home with self. Pt will call roommate/Leesa to pick him up and she will take him to his 9AM appointment at the withdrawal management clinic tomorrow. Safety plan completed with patient and documented. Provided pt with crisis line and additional outpatient psychiatric resources for therapy, medication management and substance use. Pt agreed to return to the ED if symptoms worsen, or call crisis or 911.     Nithin David LCSW  
Writer spoke to Pretty Clark NP and was informed that patient can be discharged with supportive [facility]  Please note patient has appointment tomorrow at 9am.      Writer informed Pretty Clark NP of patient feeling anxious and requesting Zoloft due to fearing he will drink tonight due to anxiety and per Pretty Clark NP she is open to ED providers prescribing but stated that outpatient providers would be best approach so that they can continue monitoring medication and patient.   
  The Precipitant Factors are hx of attempts , lack of  providers, substance abuse.  Previous Hospitalizations: None  The patient has not previously been in restraints.  Current Psychiatrist and/or  is None.     Lethality Assessment:     Today, Pt report to have had no hx of attempts in the past although, Per Chart review The potential for suicide noted by the following: noted by the following;  previous history of attempts which occured on (date)3 months prior in the form(s) of overdose,  current attempt, ideation, and current substance abuse.  The potential for homicide is not noted.  The patient has not been a perpetrator of sexual or physical abuse. There are not pending charges.  The patient is  felt to be at risk for self harm or harm to others.  The attending nurse was advised to request a search of the patient's belongings, to remove patient clothing and place it out of immediate access to the patient, the patient needs supervision, and that security has not been notified.     Section III - Psychosocial  The patient's overall mood and attitude is depressed and tearful.  Feelings of helplessness and hopelessness are observed by Pt's reports of him feeling like dying everytime he wakes up and hoping to get hit by car accidentally. Generalized anxiety is observed by Pt restless and tense. Panic observed by Pt reporting excessive worry and concern for withdrawal while requesting treatment. Pt reported \"to be going nuts over here\". Phobias are not observed.  Obsessive compulsive tendencies are not observed.       Section IV - Mental Status Exam  The patient's appearance is unkept and is tense. The patient's behavior shows poor impulse control and is restless. The patient is oriented to time, place, person and situation. The patient's speech shows no evidence of impairment. The patient's mood is depressed, is anxious, is sad, and displays anhedonia. The range of affect shows no evidence of

## 2024-12-10 NOTE — ED PROVIDER NOTES
[facility] EMERGENCY DEP  EMERGENCY DEPARTMENT ENCOUNTER      Pt Name: David Garcia  MRN: [id number]  Birthdate 1970  Date of evaluation: 2024  Provider: Kayla Terry MD    CHIEF COMPLAINT       Chief Complaint   Patient presents with    Suicidal         HISTORY OF PRESENT ILLNESS    David Garcia is a 55 yo M with h/o depression and alcohol use disorder who has been experiencing worsening thoughts of worthlessness and having suicidal ideations with thoughts of jumping into traffic.  He admits to drinking 1/2 L vodka daily and last had a beer about an hour prior to committing to the ED.      Patient noted to have dilated left pupil.  He states this is chronic and has been present for past year after getting hit in eye with baseball.  Has seen ophthalmology and is told this will be permament.           Additional history from independent historians:     Review of External Medical Records:     Nursing Notes were reviewed.    REVIEW OF SYSTEMS       Review of Systems    Except as noted above the remainder of the review of systems was reviewed and negative.       PAST MEDICAL HISTORY   No past medical history on file.      SURGICAL HISTORY     No past surgical history on file.      CURRENT MEDICATIONS       Previous Medications    COAL TAR SOLN    Apply topically as needed    MIRTAZAPINE (REMERON) 15 MG TABLET    Take 1 tablet by mouth nightly    MULTIPLE VITAMIN (MULTIVITAMIN) TABS TABLET    Take 1 tablet by mouth daily    SERTRALINE (ZOLOFT) 100 MG TABLET    Take 1 tablet by mouth daily    THIAMINE 100 MG TABLET    Take 1 tablet by mouth daily       ALLERGIES     Penicillins and Sulfa antibiotics    FAMILY HISTORY     No family history on file.       SOCIAL HISTORY       Social History     Socioeconomic History    Marital status:    Tobacco Use    Smoking status: Former     Current packs/day: 0.00     Types: Cigarettes     Quit date: 2023     Years since quittin.3    Smokeless tobacco:

## 2025-02-04 ENCOUNTER — TELEPHONE (OUTPATIENT)
Age: 55
End: 2025-02-04

## 2025-02-24 ENCOUNTER — OFFICE VISIT (OUTPATIENT)
Age: 55
End: 2025-02-24
Payer: COMMERCIAL

## 2025-02-24 ENCOUNTER — PREP FOR PROCEDURE (OUTPATIENT)
Age: 55
End: 2025-02-24

## 2025-02-24 VITALS
HEART RATE: 69 BPM | HEIGHT: 73 IN | RESPIRATION RATE: 14 BRPM | DIASTOLIC BLOOD PRESSURE: 78 MMHG | TEMPERATURE: 97.3 F | SYSTOLIC BLOOD PRESSURE: 122 MMHG | OXYGEN SATURATION: 98 % | WEIGHT: 164.2 LBS | BODY MASS INDEX: 21.76 KG/M2

## 2025-02-24 DIAGNOSIS — R22.0 CHEEK MASS: ICD-10-CM

## 2025-02-24 DIAGNOSIS — R22.0 MASS OF FACE: Primary | ICD-10-CM

## 2025-02-24 PROCEDURE — 99203 OFFICE O/P NEW LOW 30 MIN: CPT | Performed by: SURGERY

## 2025-02-24 RX ORDER — MULTIVITAMIN WITH IRON
100 TABLET ORAL DAILY
COMMUNITY

## 2025-02-24 RX ORDER — ACETAMINOPHEN 325 MG/1
1000 TABLET ORAL ONCE
OUTPATIENT
Start: 2025-02-24 | End: 2025-02-24

## 2025-02-24 RX ORDER — FOLIC ACID 1 MG/1
1 TABLET ORAL DAILY
COMMUNITY

## 2025-02-24 RX ORDER — HYDROXYZINE HYDROCHLORIDE 25 MG/1
25 TABLET, FILM COATED ORAL 3 TIMES DAILY PRN
COMMUNITY

## 2025-02-24 RX ORDER — BUPIVACAINE HYDROCHLORIDE 2.5 MG/ML
30 INJECTION, SOLUTION EPIDURAL; INFILTRATION; INTRACAUDAL ONCE
OUTPATIENT
Start: 2025-02-24 | End: 2025-02-24

## 2025-02-24 RX ORDER — UBIDECARENONE 75 MG
50 CAPSULE ORAL DAILY
COMMUNITY

## 2025-02-24 NOTE — PROGRESS NOTES
David Garcia is a 54 y.o. male who is referred by Ludy Valente NP, for further evaluation of a subcutaneous mass on the right side of his face.    Mr. Garcia tells me that he has had a subcutaneous mass on the right side of his face, above his eye, for some time now. The mass has become progressively larger and more bothersome to him. Associated discomfort due to the mass. No drainage or bleeding.   He has otherwise been in his usual state of health.     Past Medical History:   Diagnosis Date    Mass of face 2025     History reviewed. No pertinent surgical history.    History reviewed. No pertinent family history.    Social History     Socioeconomic History    Marital status:      Spouse name: None    Number of children: None    Years of education: None    Highest education level: None   Tobacco Use    Smoking status: Former     Current packs/day: 0.00     Types: Cigarettes     Start date:      Quit date: 2023     Years since quittin.5    Smokeless tobacco: Never   Substance and Sexual Activity    Alcohol use: Yes     Alcohol/week: 5.0 standard drinks of alcohol     Types: 5 Standard drinks or equivalent per week    Drug use: Not Currently     Social Determinants of Health     Food Insecurity: No Food Insecurity (2024)    Hunger Vital Sign     Worried About Running Out of Food in the Last Year: Never true     Ran Out of Food in the Last Year: Never true   Transportation Needs: No Transportation Needs (2024)    PRAPARE - Transportation     Lack of Transportation (Medical): No     Lack of Transportation (Non-Medical): No   Housing Stability: Low Risk  (2024)    Housing Stability Vital Sign     Unable to Pay for Housing in the Last Year: No     Number of Places Lived in the Last Year: 1     Unstable Housing in the Last Year: No     Review of systems negative except as noted.    Review of Systems   Skin:         Discomfort at site of mass.     Physical Exam  Vitals

## 2025-02-24 NOTE — PROGRESS NOTES
Identified patient with two patient identifiers (name and ). Reviewed chart in preparation for visit and have obtained necessary documentation.    David Garcia is a 54 y.o. male  Chief Complaint   Patient presents with    New Patient    Cyst     face     /78 (Site: Left Upper Arm, Position: Sitting, Cuff Size: Large Adult)   Pulse 69   Temp 97.3 °F (36.3 °C) (Oral)   Resp 14   Ht 1.854 m (6' 1\")   Wt 74.5 kg (164 lb 3.2 oz)   SpO2 98%   BMI 21.66 kg/m²     1. Have you been to the ER, urgent care clinic since your last visit?  Hospitalized since your last visit?no    2. Have you seen or consulted any other health care providers outside of the [facility] since your last visit?  Include any pap smears or colon screening. no

## 2025-02-25 ENCOUNTER — TELEPHONE (OUTPATIENT)
Age: 55
End: 2025-02-25

## 2025-02-26 ENCOUNTER — TELEPHONE (OUTPATIENT)
Age: 55
End: 2025-02-26

## 2025-03-10 ENCOUNTER — ANESTHESIA EVENT (OUTPATIENT)
Facility: HOSPITAL | Age: 55
End: 2025-03-10
Payer: COMMERCIAL

## 2025-03-10 RX ORDER — IBUPROFEN 600 MG/1
600 TABLET, FILM COATED ORAL EVERY 6 HOURS PRN
COMMUNITY

## 2025-03-10 RX ORDER — MULTIVIT-MIN/IRON/FOLIC ACID/K 18-600-40
2000 CAPSULE ORAL DAILY
COMMUNITY

## 2025-03-10 NOTE — PERIOP NOTE
PAT PREOP PHONE INTERVIEW COMPLETED WITH:     PATIENT ADVISED NOT TO EAT AFTER MIDNIGHT, INSTRUCTED TO FOLLOW CLEAR LIQUID DIET AFTER MIDNIGHT. LEAVE ALL VALUABLES AT HOME; DO BRING PICTURE ID, INSURANCE CARD AND ANY COPAY; WEAR COMFORTABLE CLOTHING;  NO PERFUMES, POWDERS, LOTIONS; NO ALCOHOL 24 HOURS BEFORE OR AFTER SURGERY;  WILL NEED TO BE DRIVEN HOME BY FAMILY OR FRIEND;  AVOID TAKING NSAIDS, ASPIRIN, FISH OIL, VITAMIN E OR GLUCOSAMINE/CHONDROITIN DURING THIS TIME PRIOR TO SURGERY;  MAY TAKE TYLENOL.  INSTRUCTED TO REPORT TO [facility] ADMITTING DEPARTMENT AT THE TIME GIVEN BY SURGEON'S OFFICE.  INSTRUCTION PROVIDED FOR CHG SOAP.

## 2025-03-11 ENCOUNTER — ANESTHESIA (OUTPATIENT)
Facility: HOSPITAL | Age: 55
End: 2025-03-11
Payer: COMMERCIAL

## 2025-03-11 ENCOUNTER — HOSPITAL ENCOUNTER (OUTPATIENT)
Facility: HOSPITAL | Age: 55
Setting detail: OUTPATIENT SURGERY
Discharge: HOME | End: 2025-03-11
Attending: SURGERY | Admitting: SURGERY
Payer: COMMERCIAL

## 2025-03-11 VITALS
RESPIRATION RATE: 15 BRPM | HEIGHT: 73 IN | BODY MASS INDEX: 21.87 KG/M2 | WEIGHT: 165 LBS | SYSTOLIC BLOOD PRESSURE: 125 MMHG | OXYGEN SATURATION: 93 % | TEMPERATURE: 98.1 F | DIASTOLIC BLOOD PRESSURE: 82 MMHG | HEART RATE: 61 BPM

## 2025-03-11 DIAGNOSIS — R22.0 MASS OF FACE: Primary | ICD-10-CM

## 2025-03-11 PROCEDURE — 7100000000 HC PACU RECOVERY - FIRST 15 MIN: Performed by: SURGERY

## 2025-03-11 PROCEDURE — 7100000001 HC PACU RECOVERY - ADDTL 15 MIN: Performed by: SURGERY

## 2025-03-11 PROCEDURE — 3700000001 HC ADD 15 MINUTES (ANESTHESIA): Performed by: SURGERY

## 2025-03-11 PROCEDURE — 2500000003 HC RX 250 WO HCPCS: Performed by: SURGERY

## 2025-03-11 PROCEDURE — 3700000000 HC ANESTHESIA ATTENDED CARE: Performed by: SURGERY

## 2025-03-11 PROCEDURE — 6360000002 HC RX W HCPCS: Performed by: SURGERY

## 2025-03-11 PROCEDURE — 3600000002 HC SURGERY LEVEL 2 BASE: Performed by: SURGERY

## 2025-03-11 PROCEDURE — 2709999900 HC NON-CHARGEABLE SUPPLY: Performed by: SURGERY

## 2025-03-11 PROCEDURE — 6360000002 HC RX W HCPCS: Performed by: NURSE ANESTHETIST, CERTIFIED REGISTERED

## 2025-03-11 PROCEDURE — 6370000000 HC RX 637 (ALT 250 FOR IP): Performed by: ANESTHESIOLOGY

## 2025-03-11 PROCEDURE — 7100000010 HC PHASE II RECOVERY - FIRST 15 MIN: Performed by: SURGERY

## 2025-03-11 PROCEDURE — 3600000012 HC SURGERY LEVEL 2 ADDTL 15MIN: Performed by: SURGERY

## 2025-03-11 PROCEDURE — 7100000011 HC PHASE II RECOVERY - ADDTL 15 MIN: Performed by: SURGERY

## 2025-03-11 PROCEDURE — 6360000002 HC RX W HCPCS: Performed by: ANESTHESIOLOGY

## 2025-03-11 PROCEDURE — 2580000003 HC RX 258: Performed by: ANESTHESIOLOGY

## 2025-03-11 PROCEDURE — 88304 TISSUE EXAM BY PATHOLOGIST: CPT

## 2025-03-11 RX ORDER — NALOXONE HYDROCHLORIDE 0.4 MG/ML
INJECTION, SOLUTION INTRAMUSCULAR; INTRAVENOUS; SUBCUTANEOUS PRN
Status: DISCONTINUED | OUTPATIENT
Start: 2025-03-11 | End: 2025-03-11 | Stop reason: HOSPADM

## 2025-03-11 RX ORDER — HYDRALAZINE HYDROCHLORIDE 20 MG/ML
10 INJECTION INTRAMUSCULAR; INTRAVENOUS ONCE
Status: DISCONTINUED | OUTPATIENT
Start: 2025-03-11 | End: 2025-03-11 | Stop reason: HOSPADM

## 2025-03-11 RX ORDER — MIDAZOLAM HYDROCHLORIDE 1 MG/ML
INJECTION, SOLUTION INTRAMUSCULAR; INTRAVENOUS
Status: DISCONTINUED | OUTPATIENT
Start: 2025-03-11 | End: 2025-03-11 | Stop reason: SDUPTHER

## 2025-03-11 RX ORDER — ACETAMINOPHEN 500 MG
1000 TABLET ORAL ONCE
Status: DISCONTINUED | OUTPATIENT
Start: 2025-03-11 | End: 2025-03-11 | Stop reason: SDUPTHER

## 2025-03-11 RX ORDER — OXYCODONE HYDROCHLORIDE 5 MG/1
5 TABLET ORAL
Status: DISCONTINUED | OUTPATIENT
Start: 2025-03-11 | End: 2025-03-11 | Stop reason: HOSPADM

## 2025-03-11 RX ORDER — SODIUM CHLORIDE 0.9 % (FLUSH) 0.9 %
5-40 SYRINGE (ML) INJECTION PRN
Status: DISCONTINUED | OUTPATIENT
Start: 2025-03-11 | End: 2025-03-11 | Stop reason: HOSPADM

## 2025-03-11 RX ORDER — OXYCODONE HYDROCHLORIDE 5 MG/1
5 TABLET ORAL EVERY 4 HOURS PRN
Qty: 3 TABLET | Refills: 0 | Status: SHIPPED | OUTPATIENT
Start: 2025-03-11 | End: 2025-03-14

## 2025-03-11 RX ORDER — FENTANYL CITRATE 50 UG/ML
25 INJECTION, SOLUTION INTRAMUSCULAR; INTRAVENOUS EVERY 5 MIN PRN
Status: DISCONTINUED | OUTPATIENT
Start: 2025-03-11 | End: 2025-03-11 | Stop reason: HOSPADM

## 2025-03-11 RX ORDER — LIDOCAINE HYDROCHLORIDE 10 MG/ML
1 INJECTION, SOLUTION EPIDURAL; INFILTRATION; INTRACAUDAL; PERINEURAL
Status: COMPLETED | OUTPATIENT
Start: 2025-03-11 | End: 2025-03-11

## 2025-03-11 RX ORDER — SODIUM CHLORIDE 0.9 % (FLUSH) 0.9 %
5-40 SYRINGE (ML) INJECTION EVERY 12 HOURS SCHEDULED
Status: DISCONTINUED | OUTPATIENT
Start: 2025-03-11 | End: 2025-03-11 | Stop reason: HOSPADM

## 2025-03-11 RX ORDER — FENTANYL CITRATE 50 UG/ML
100 INJECTION, SOLUTION INTRAMUSCULAR; INTRAVENOUS
Status: DISCONTINUED | OUTPATIENT
Start: 2025-03-11 | End: 2025-03-11 | Stop reason: HOSPADM

## 2025-03-11 RX ORDER — MIDAZOLAM HYDROCHLORIDE 2 MG/2ML
2 INJECTION, SOLUTION INTRAMUSCULAR; INTRAVENOUS PRN
Status: DISCONTINUED | OUTPATIENT
Start: 2025-03-11 | End: 2025-03-11 | Stop reason: HOSPADM

## 2025-03-11 RX ORDER — SODIUM CHLORIDE 9 MG/ML
INJECTION, SOLUTION INTRAVENOUS PRN
Status: DISCONTINUED | OUTPATIENT
Start: 2025-03-11 | End: 2025-03-11 | Stop reason: HOSPADM

## 2025-03-11 RX ORDER — ONDANSETRON 2 MG/ML
INJECTION INTRAMUSCULAR; INTRAVENOUS
Status: DISCONTINUED | OUTPATIENT
Start: 2025-03-11 | End: 2025-03-11 | Stop reason: SDUPTHER

## 2025-03-11 RX ORDER — ACETAMINOPHEN 500 MG
1000 TABLET ORAL EVERY 6 HOURS PRN
Qty: 30 TABLET | Refills: 2 | Status: SHIPPED | OUTPATIENT
Start: 2025-03-11

## 2025-03-11 RX ORDER — HYDROMORPHONE HYDROCHLORIDE 1 MG/ML
0.5 INJECTION, SOLUTION INTRAMUSCULAR; INTRAVENOUS; SUBCUTANEOUS EVERY 5 MIN PRN
Status: DISCONTINUED | OUTPATIENT
Start: 2025-03-11 | End: 2025-03-11 | Stop reason: HOSPADM

## 2025-03-11 RX ORDER — SODIUM CHLORIDE, SODIUM LACTATE, POTASSIUM CHLORIDE, CALCIUM CHLORIDE 600; 310; 30; 20 MG/100ML; MG/100ML; MG/100ML; MG/100ML
INJECTION, SOLUTION INTRAVENOUS CONTINUOUS
Status: DISCONTINUED | OUTPATIENT
Start: 2025-03-11 | End: 2025-03-11 | Stop reason: HOSPADM

## 2025-03-11 RX ORDER — ACETAMINOPHEN 500 MG
1000 TABLET ORAL ONCE
Status: COMPLETED | OUTPATIENT
Start: 2025-03-11 | End: 2025-03-11

## 2025-03-11 RX ORDER — ACETAMINOPHEN 500 MG
1000 TABLET ORAL EVERY 6 HOURS PRN
COMMUNITY

## 2025-03-11 RX ORDER — BUPIVACAINE HYDROCHLORIDE 2.5 MG/ML
INJECTION, SOLUTION EPIDURAL; INFILTRATION; INTRACAUDAL PRN
Status: DISCONTINUED | OUTPATIENT
Start: 2025-03-11 | End: 2025-03-11 | Stop reason: HOSPADM

## 2025-03-11 RX ORDER — ONDANSETRON 2 MG/ML
4 INJECTION INTRAMUSCULAR; INTRAVENOUS
Status: DISCONTINUED | OUTPATIENT
Start: 2025-03-11 | End: 2025-03-11 | Stop reason: HOSPADM

## 2025-03-11 RX ORDER — PROCHLORPERAZINE EDISYLATE 5 MG/ML
5 INJECTION INTRAMUSCULAR; INTRAVENOUS
Status: DISCONTINUED | OUTPATIENT
Start: 2025-03-11 | End: 2025-03-11 | Stop reason: HOSPADM

## 2025-03-11 RX ORDER — GLYCOPYRROLATE 0.2 MG/ML
INJECTION INTRAMUSCULAR; INTRAVENOUS
Status: DISCONTINUED | OUTPATIENT
Start: 2025-03-11 | End: 2025-03-11 | Stop reason: SDUPTHER

## 2025-03-11 RX ADMIN — ACETAMINOPHEN 1000 MG: 500 TABLET ORAL at 08:30

## 2025-03-11 RX ADMIN — SODIUM CHLORIDE, SODIUM LACTATE, POTASSIUM CHLORIDE, AND CALCIUM CHLORIDE: .6; .31; .03; .02 INJECTION, SOLUTION INTRAVENOUS at 08:41

## 2025-03-11 RX ADMIN — ONDANSETRON 4 MG: 2 INJECTION INTRAMUSCULAR; INTRAVENOUS at 09:58

## 2025-03-11 RX ADMIN — LIDOCAINE HYDROCHLORIDE 1 ML: 10 INJECTION, SOLUTION EPIDURAL; INFILTRATION; INTRACAUDAL; PERINEURAL at 08:42

## 2025-03-11 RX ADMIN — PROPOFOL 100 MCG/KG/MIN: 10 INJECTION, EMULSION INTRAVENOUS at 09:25

## 2025-03-11 RX ADMIN — MIDAZOLAM 2 MG: 1 INJECTION INTRAMUSCULAR; INTRAVENOUS at 09:18

## 2025-03-11 RX ADMIN — GLYCOPYRROLATE 0.2 MG: 0.2 INJECTION INTRAMUSCULAR; INTRAVENOUS at 09:35

## 2025-03-11 RX ADMIN — WATER 2000 MG: 1 INJECTION INTRAMUSCULAR; INTRAVENOUS; SUBCUTANEOUS at 09:35

## 2025-03-11 ASSESSMENT — PAIN SCALES - GENERAL: PAINLEVEL_OUTOF10: 1

## 2025-03-11 ASSESSMENT — PAIN DESCRIPTION - LOCATION: LOCATION: NOSE

## 2025-03-11 ASSESSMENT — PAIN DESCRIPTION - DESCRIPTORS
DESCRIPTORS: ACHING
DESCRIPTORS: DISCOMFORT

## 2025-03-11 ASSESSMENT — PAIN - FUNCTIONAL ASSESSMENT: PAIN_FUNCTIONAL_ASSESSMENT: 0-10

## 2025-03-11 NOTE — ANESTHESIA PRE PROCEDURE
Department of Anesthesiology  Preprocedure Note       Name:  David Garcia   Age:  54 y.o.  :  1970                                          MRN:  [id number]         Date:  3/11/2025      Surgeon: Surgeon(s):  Norman Magana MD    Procedure: Procedure(s):  EXCISE SUBCUTANEOUS MASS RIGHT SIDE OF FACE    Medications prior to admission:   Prior to Admission medications    Medication Sig Start Date End Date Taking? Authorizing Provider   acetaminophen (TYLENOL) 500 MG tablet Take 2 tablets by mouth every 6 hours as needed for Pain   Yes Rosemary Jackson MD   vitamin D 50 MCG ( UT) CAPS capsule Take 1 capsule by mouth daily   Yes Rosemary Jackson MD   ibuprofen (ADVIL;MOTRIN) 600 MG tablet Take 1 tablet by mouth every 6 hours as needed for Pain   Yes Rosemary Jackson MD   folic acid (FOLVITE) 1 MG tablet Take 1 tablet by mouth daily   Yes Rosemayr Jackson MD   hydrOXYzine HCl (ATARAX) 25 MG tablet Take 1 tablet by mouth 3 times daily as needed for Itching   Yes Rosemary Jackson MD   vitamin B-12 (CYANOCOBALAMIN) 100 MCG tablet Take 0.5 tablets by mouth daily   Yes Rosemary Jackson MD   vitamin B-6 (PYRIDOXINE) 100 MG tablet Take 1 tablet by mouth daily   Yes Rosemary Jackson MD   mirtazapine (REMERON) 15 MG tablet Take 1 tablet by mouth nightly 24  Yes Pretty Clark APRN - NP   sertraline (ZOLOFT) 100 MG tablet Take 1 tablet by mouth daily 2/10/24  Yes Pretty Clark APRN - NP   Multiple Vitamin (MULTIVITAMIN) TABS tablet Take 1 tablet by mouth daily 2/10/24  Yes Pretty Clakr APRN - NP   thiamine 100 MG tablet Take 1 tablet by mouth daily 2/10/24  Yes Pretty Clark APRN - NP   Coal Tar SOLN Apply topically as needed  Patient not taking: Reported on 3/11/2025    ProviderRosemary MD       Current medications:    Current Facility-Administered Medications   Medication Dose Route Frequency Provider Last Rate Last Admin   • fentaNYL (SUBLIMAZE) injection

## 2025-03-11 NOTE — PROGRESS NOTES
Discharge instructions reviewed with patient and family using teachback. All questions have been answered. Prescriptions sent to [facility] pharmacy. Vital signs stable, pain appropriately managed. Patient wheeled off the unit with PACU staff.

## 2025-03-11 NOTE — OP NOTE
[address]  [address]                            OPERATIVE REPORT      PATIENT NAME: SOWMYA CAR               : 1970  MED REC NO: [id number]                       ROOM: OR  ACCOUNT NO: [id number]                       ADMIT DATE: 2025  PROVIDER: Norman Magana MD    DATE OF SERVICE:  2025    PREOPERATIVE DIAGNOSES:  Subcutaneous mass, right side of face.    POSTOPERATIVE DIAGNOSES:  Sebaceous cyst, right side of face.    PROCEDURES PERFORMED:  Excise sebaceous cyst, right side of face.    SURGEON:  Norman Magana MD    ASSISTANT:  Assistant surgeon, Audra Richey SA.    ANESTHESIA:  Local with intravenous sedation.    ESTIMATED BLOOD LOSS:  Minimal.    SPECIMENS REMOVED:  Sebaceous cyst right side of face to pathology.    INTRAOPERATIVE FINDINGS:  ***     COMPLICATIONS:  None.    IMPLANTS:  ***    INDICATIONS:  The patient is a 54-year-old man with a well-circumscribed freely movable subcutaneous mass on the right side of his face.  The patient was brought to the operating at this time for excision of the mass as it is bothersome to him.  The risks of the procedure including, but not limited to infection, bleeding, the need for further surgery, and recurrence were discussed in detail with the patient.  The patient understood and wished to proceed.    DESCRIPTION OF PROCEDURE:  After consent was obtained, the patient brought to the operating room where he was placed in the supine position on the operating room table.  Following the induction of an adequate level of intravenous sedation, compression devices were placed on both lower extremities.  The right side of the face was prepped with Betadine and draped as a sterile field.  Local anesthetic was infiltrated and an incision over the mass was opened sharply.  As the incision was deepened, the mass was readily identified and appeared to be a sebaceous cyst.  The

## 2025-03-11 NOTE — H&P
Date of Surgery Update:  David Garcia was seen and examined.  History and physical has been reviewed. The patient has been examined. There have been no significant clinical changes since the completion of the originally dated History and Physical.  Patient identified by surgeon; surgical site was confirmed by patient and surgeon.    Signed By: Norman Magana MD     March 11, 2025 9:02 AM         Please note from the office and include the additional information below:    Past Medical History  Past Medical History:   Diagnosis Date    Mass of face 02/24/2025        Past Surgical History  Past Surgical History:   Procedure Laterality Date    CHOLECYSTECTOMY  2020    LUNG BIOPSY      endoscopic biopsy of lung-negative    PELVIC ABCESS DRAINAGE Bilateral     bilateral inner thigh abcess removal        Social History  The patient David Garcia  reports that he has been smoking cigarettes. He started smoking about 35 years ago. He has a 17.6 pack-year smoking history. He has been exposed to tobacco smoke. He has never used smokeless tobacco. He reports that he does not currently use alcohol. He reports that he does not currently use drugs.     Family History  Family History   Problem Relation Age of Onset    Anesth Problems Neg Hx

## 2025-03-11 NOTE — DISCHARGE INSTRUCTIONS
Patient Discharge Instructions    David Garcia / [id number] : 1970    Admitted 3/11/2025 Discharged: 3/11/2025       It is important that you take the medication exactly as they are prescribed.   Keep your medication in the bottles provided by the pharmacist and keep a list of the medication names, dosages, and times to be taken in your wallet.   Do not take other medications without consulting your doctor.       What to do at Home    Recommended diet: Regular.    Recommended activity: No Restrictions.    No Driving While Taking Oxycodone.    Tylenol 1000 mg every 6 hours as needed for pain.     Ice pack to surgical site as needed.     Oxycodone as needed for severe pain.    May Take Shower in 48 hours.    If you experience any of the following symptoms Fevers, Chills, Nausea, Vomitting, Redness or Drainage at Surgical Site(s) or Any Other Questions or Concerns Please Call -  [phone].    Follow-up with Dr. Magana in 10-14 days.        Information obtained by :  I understand that if any problems occur once I am at home I am to contact my physician.    I understand and acknowledge receipt of the instructions indicated above.                                                                                                                                           Physician's or R.N.'s Signature                                                                  Date/Time                                                                                                                                              Patient or Representative Signature                                                          Date/Time     ______________________________________________________________________    Anesthesia Discharge Instructions    After general anesthesia or intervenous sedation, for 24 hours or while taking prescription Narcotics:  Limit your activities  Do not drive or operate hazardous machinery  If you have not

## 2025-03-11 NOTE — ANESTHESIA POSTPROCEDURE EVALUATION
Department of Anesthesiology  Postprocedure Note    Patient: David Garcia  MRN: [id number]  YOB: 1970  Date of evaluation: 3/11/2025    Procedure Summary       Date: 03/11/25 Room / Location: [facility] MAIN OR [address] [place] [address] MAIN OR    Anesthesia Start: 0920 Anesthesia Stop: 1004    Procedure: EXCISE SUBCUTANEOUS MASS RIGHT SIDE OF FACE (Right: Face) Diagnosis:       Cheek mass      (Cheek mass [R22.0])    Providers: Norman Magana MD Responsible Provider: Andrzej Curiel MD    Anesthesia Type: MAC ASA Status: 2            Anesthesia Type: No value filed.    Elgin Phase I: Elgin Score: 9    Elgin Phase II: Elgin Score: 10    Anesthesia Post Evaluation    Patient location during evaluation: PACU  Patient participation: complete - patient participated  Level of consciousness: awake  Airway patency: patent  Nausea & Vomiting: no nausea  Cardiovascular status: blood pressure returned to baseline and hemodynamically stable  Respiratory status: acceptable  Hydration status: stable  Multimodal analgesia pain management approach    No notable events documented.

## 2025-03-11 NOTE — BRIEF OP NOTE
Brief Postoperative Note      Patient: David Garcia  YOB: 1970  MRN: [id number]    Date of Procedure: 3/11/2025    Pre-Op Diagnosis: Subcutaneous Mass Right Side of Face.    Post-Op Diagnosis:  Sebaceous Cyst Right Side of Face.       Procedure:   Excise Sebaceous Cyst Right Side of Face.    Surgeon(s):  Norman Magana MD    Assistant: Audra Richey SA    Anesthesia: MAC    Estimated Blood Loss (mL): Minimal    Complications: None    Specimens:   ID Type Source Tests Collected by Time Destination   1 : Sebaceous Cyst Tissue Face SURGICAL PATHOLOGY Norman Magana MD 3/11/2025 0944        Implants:  * No implants in log *      Drains: * No LDAs found *    Findings:  Infection Present At Time Of Surgery (PATOS) (choose all levels that have infection present):  No infection present  Other Findings: Sebaceous cyst - approximately 1 cm x 1 cm.  This procedure was not performed to treat primary cutaneous melanoma through wide local excision    Electronically signed by Norman Magana MD on 3/11/2025 at 10:02 AM

## 2025-03-25 ENCOUNTER — OFFICE VISIT (OUTPATIENT)
Age: 55
End: 2025-03-25

## 2025-03-25 VITALS
HEART RATE: 76 BPM | TEMPERATURE: 98.3 F | SYSTOLIC BLOOD PRESSURE: 131 MMHG | OXYGEN SATURATION: 93 % | DIASTOLIC BLOOD PRESSURE: 87 MMHG | RESPIRATION RATE: 17 BRPM | BODY MASS INDEX: 22.13 KG/M2 | WEIGHT: 167 LBS | HEIGHT: 73 IN

## 2025-03-25 DIAGNOSIS — Z09 POSTOP CHECK: Primary | ICD-10-CM

## 2025-03-25 PROCEDURE — 99024 POSTOP FOLLOW-UP VISIT: CPT | Performed by: NURSE PRACTITIONER

## 2025-03-25 ASSESSMENT — PATIENT HEALTH QUESTIONNAIRE - PHQ9
2. FEELING DOWN, DEPRESSED OR HOPELESS: NOT AT ALL
SUM OF ALL RESPONSES TO PHQ QUESTIONS 1-9: 0
1. LITTLE INTEREST OR PLEASURE IN DOING THINGS: NOT AT ALL

## 2025-03-25 NOTE — PROGRESS NOTES
Subjective:      David Garcia is a 54 y.o. male who presents today for wound check. He is 2 weeks status post excision of sebaceous cyst of right side of face.         Mr. Garcia has a reminder for a \"due or due soon\" health maintenance. I have asked that he contact his primary care provider for follow-up on this health maintenance.          Objective:     /87 (BP Site: Left Upper Arm, Patient Position: Sitting, BP Cuff Size: Large Adult)   Pulse 76   Temp 98.3 °F (36.8 °C) (Oral)   Resp 17   Ht 1.854 m (6' 1\")   Wt 75.8 kg (167 lb)   SpO2 93%   BMI 22.03 kg/m²     Wound:   wound margins intact and healing well.  No signs of infection. Very small seroma     Assessment:     Wound check.       Plan:     Follow up as needed  May get incisions wet.   Path reviewed with patient.     REGINALD Valladares - NP

## 2025-03-25 NOTE — PROGRESS NOTES
Identified patient with two patient identifiers (name and ). Reviewed chart in preparation for visit and have obtained necessary documentation.    David Garcia is a 54 y.o. male  Chief Complaint   Patient presents with    Post-Op Check     2 week s/p EXCISE SUBCUTANEOUS MASS RIGHT SIDE OF FACE DOS 3/11/25     /87 (BP Site: Left Upper Arm, Patient Position: Sitting, BP Cuff Size: Large Adult)   Pulse 76   Temp 98.3 °F (36.8 °C) (Oral)   Resp 17   Ht 1.854 m (6' 1\")   Wt 75.8 kg (167 lb)   SpO2 93%   BMI 22.03 kg/m²     1. Have you been to the ER, urgent care clinic since your last visit?  Hospitalized since your last visit?no    2. Have you seen or consulted any other health care providers outside of the [facility] since your last visit?  Include any pap smears or colon screening. no

## 2025-04-24 ENCOUNTER — ANESTHESIA (OUTPATIENT)
Facility: HOSPITAL | Age: 55
End: 2025-04-24
Payer: COMMERCIAL

## 2025-04-24 ENCOUNTER — HOSPITAL ENCOUNTER (OUTPATIENT)
Facility: HOSPITAL | Age: 55
Setting detail: OUTPATIENT SURGERY
Discharge: HOME | End: 2025-04-24
Attending: INTERNAL MEDICINE | Admitting: INTERNAL MEDICINE
Payer: COMMERCIAL

## 2025-04-24 ENCOUNTER — ANESTHESIA EVENT (OUTPATIENT)
Facility: HOSPITAL | Age: 55
End: 2025-04-24
Payer: COMMERCIAL

## 2025-04-24 VITALS
SYSTOLIC BLOOD PRESSURE: 120 MMHG | HEIGHT: 73 IN | RESPIRATION RATE: 15 BRPM | HEART RATE: 62 BPM | BODY MASS INDEX: 21.6 KG/M2 | OXYGEN SATURATION: 98 % | WEIGHT: 163 LBS | DIASTOLIC BLOOD PRESSURE: 78 MMHG

## 2025-04-24 PROCEDURE — 3600007502: Performed by: INTERNAL MEDICINE

## 2025-04-24 PROCEDURE — 7100000011 HC PHASE II RECOVERY - ADDTL 15 MIN: Performed by: INTERNAL MEDICINE

## 2025-04-24 PROCEDURE — 88305 TISSUE EXAM BY PATHOLOGIST: CPT

## 2025-04-24 PROCEDURE — 2580000003 HC RX 258: Performed by: NURSE ANESTHETIST, CERTIFIED REGISTERED

## 2025-04-24 PROCEDURE — 7100000010 HC PHASE II RECOVERY - FIRST 15 MIN: Performed by: INTERNAL MEDICINE

## 2025-04-24 PROCEDURE — 3700000000 HC ANESTHESIA ATTENDED CARE: Performed by: INTERNAL MEDICINE

## 2025-04-24 PROCEDURE — 2709999900 HC NON-CHARGEABLE SUPPLY: Performed by: INTERNAL MEDICINE

## 2025-04-24 PROCEDURE — 3700000001 HC ADD 15 MINUTES (ANESTHESIA): Performed by: INTERNAL MEDICINE

## 2025-04-24 PROCEDURE — 3600007512: Performed by: INTERNAL MEDICINE

## 2025-04-24 PROCEDURE — 6360000002 HC RX W HCPCS: Performed by: NURSE ANESTHETIST, CERTIFIED REGISTERED

## 2025-04-24 RX ORDER — SODIUM CHLORIDE 0.9 % (FLUSH) 0.9 %
5-40 SYRINGE (ML) INJECTION PRN
Status: DISCONTINUED | OUTPATIENT
Start: 2025-04-24 | End: 2025-04-24 | Stop reason: HOSPADM

## 2025-04-24 RX ORDER — SODIUM CHLORIDE 0.9 % (FLUSH) 0.9 %
5-40 SYRINGE (ML) INJECTION EVERY 12 HOURS SCHEDULED
Status: DISCONTINUED | OUTPATIENT
Start: 2025-04-24 | End: 2025-04-24 | Stop reason: HOSPADM

## 2025-04-24 RX ORDER — SODIUM CHLORIDE 9 MG/ML
INJECTION, SOLUTION INTRAVENOUS PRN
Status: DISCONTINUED | OUTPATIENT
Start: 2025-04-24 | End: 2025-04-24 | Stop reason: HOSPADM

## 2025-04-24 RX ORDER — LIDOCAINE HYDROCHLORIDE 20 MG/ML
INJECTION, SOLUTION EPIDURAL; INFILTRATION; INTRACAUDAL; PERINEURAL
Status: DISCONTINUED | OUTPATIENT
Start: 2025-04-24 | End: 2025-04-24 | Stop reason: SDUPTHER

## 2025-04-24 RX ORDER — SODIUM CHLORIDE 9 MG/ML
INJECTION, SOLUTION INTRAVENOUS CONTINUOUS
Status: DISCONTINUED | OUTPATIENT
Start: 2025-04-24 | End: 2025-04-24 | Stop reason: HOSPADM

## 2025-04-24 RX ORDER — SODIUM CHLORIDE 9 MG/ML
INJECTION, SOLUTION INTRAVENOUS
Status: DISCONTINUED | OUTPATIENT
Start: 2025-04-24 | End: 2025-04-24 | Stop reason: SDUPTHER

## 2025-04-24 RX ADMIN — PROPOFOL 50 MG: 10 INJECTION, EMULSION INTRAVENOUS at 15:46

## 2025-04-24 RX ADMIN — PROPOFOL 50 MG: 10 INJECTION, EMULSION INTRAVENOUS at 15:57

## 2025-04-24 RX ADMIN — PROPOFOL 80 MG: 10 INJECTION, EMULSION INTRAVENOUS at 15:30

## 2025-04-24 RX ADMIN — PROPOFOL 70 MG: 10 INJECTION, EMULSION INTRAVENOUS at 15:25

## 2025-04-24 RX ADMIN — LIDOCAINE HYDROCHLORIDE 100 MG: 20 INJECTION, SOLUTION EPIDURAL; INFILTRATION; INTRACAUDAL; PERINEURAL at 15:25

## 2025-04-24 RX ADMIN — PROPOFOL 50 MG: 10 INJECTION, EMULSION INTRAVENOUS at 15:33

## 2025-04-24 RX ADMIN — PROPOFOL 50 MG: 10 INJECTION, EMULSION INTRAVENOUS at 15:43

## 2025-04-24 RX ADMIN — PROPOFOL 50 MG: 10 INJECTION, EMULSION INTRAVENOUS at 15:40

## 2025-04-24 RX ADMIN — PROPOFOL 50 MG: 10 INJECTION, EMULSION INTRAVENOUS at 15:50

## 2025-04-24 RX ADMIN — PROPOFOL 50 MG: 10 INJECTION, EMULSION INTRAVENOUS at 15:27

## 2025-04-24 RX ADMIN — PROPOFOL 50 MG: 10 INJECTION, EMULSION INTRAVENOUS at 15:53

## 2025-04-24 RX ADMIN — PROPOFOL 50 MG: 10 INJECTION, EMULSION INTRAVENOUS at 15:36

## 2025-04-24 RX ADMIN — SODIUM CHLORIDE: 9 INJECTION, SOLUTION INTRAVENOUS at 15:19

## 2025-04-24 ASSESSMENT — PAIN - FUNCTIONAL ASSESSMENT: PAIN_FUNCTIONAL_ASSESSMENT: 0-10

## 2025-04-24 ASSESSMENT — LIFESTYLE VARIABLES: SMOKING_STATUS: 1

## 2025-04-24 NOTE — ANESTHESIA PRE PROCEDURE
Department of Anesthesiology  Preprocedure Note       Name:  David Garcia   Age:  54 y.o.  :  1970                                          MRN:  [id number]         Date:  2025      Surgeon: Surgeon(s):  Estevan Gomez MD    Procedure: Procedure(s):  COLONOSCOPY    Medications prior to admission:   Prior to Admission medications    Medication Sig Start Date End Date Taking? Authorizing Provider   acetaminophen (TYLENOL) 500 MG tablet Take 2 tablets by mouth every 6 hours as needed for Pain    Rosemary Jackson MD   acetaminophen (TYLENOL) 500 MG tablet Take 2 tablets by mouth every 6 hours as needed for Pain 3/11/25   Norman Magana MD   vitamin D 50 MCG (2000) CAPS capsule Take 1 capsule by mouth daily    Rosemary Jackson MD   ibuprofen (ADVIL;MOTRIN) 600 MG tablet Take 1 tablet by mouth every 6 hours as needed for Pain    Rosemary Jackson MD   folic acid (FOLVITE) 1 MG tablet Take 1 tablet by mouth daily    Rosemary Jackson MD   hydrOXYzine HCl (ATARAX) 25 MG tablet Take 1 tablet by mouth 3 times daily as needed for Itching    Rosemary Jackson MD   vitamin B-12 (CYANOCOBALAMIN) 100 MCG tablet Take 0.5 tablets by mouth daily    Rosemary Jackson MD   vitamin B-6 (PYRIDOXINE) 100 MG tablet Take 1 tablet by mouth daily    Rosemary Jackson MD   mirtazapine (REMERON) 15 MG tablet Take 1 tablet by mouth nightly 24   Pretty Clark APRN - NP   sertraline (ZOLOFT) 100 MG tablet Take 1 tablet by mouth daily 2/10/24   Pretty Clark APRN - NP   Multiple Vitamin (MULTIVITAMIN) TABS tablet Take 1 tablet by mouth daily 2/10/24   Pretty Clark APRN - NP   thiamine 100 MG tablet Take 1 tablet by mouth daily 2/10/24   Pretty Clark APRN - NP   Coal Tar SOLN Apply topically as needed  Patient not taking: Reported on 3/25/2025    Rosemary Jackson MD       Current medications:    Current Facility-Administered Medications   Medication Dose Route Frequency

## 2025-04-24 NOTE — ANESTHESIA POSTPROCEDURE EVALUATION
Department of Anesthesiology  Postprocedure Note    Patient: David Garcia  MRN: [id number]  YOB: 1970  Date of evaluation: 4/24/2025    Procedure Summary       Date: 04/24/25 Room / Location: [facility] 04 / [facility] ENDOSCOPY    Anesthesia Start: 1523 Anesthesia Stop: 1602    Procedure: COLONOSCOPY (Lower GI Region) Diagnosis:       Colon cancer screening      (Colon cancer screening [Z12.11])    Surgeons: Estevan Gomez MD Responsible Provider: Yari Mora MD    Anesthesia Type: MAC ASA Status: 2            Anesthesia Type: No value filed.    Elgin Phase I: Elgin Score: 10    Elgin Phase II: Elgin Score: 9    Anesthesia Post Evaluation    Level of consciousness: awake  Airway patency: patent  Nausea & Vomiting: no nausea  Cardiovascular status: hemodynamically stable  Respiratory status: acceptable  Hydration status: euvolemic  Pain management: adequate        No notable events documented.

## 2025-04-24 NOTE — H&P
[address] [address] [phone]        History and Physical     NAME:  David Garcia   :  1970   MRN:  [id number]         HPI:  David Garcia is a 54 y.o. male here for colonoscopy. No prior colonoscopy. Patient denies any family h/o colon cancer. No GI symptoms.     Past Surgical History:   Procedure Laterality Date    CHOLECYSTECTOMY      CYST REMOVAL      from thigh    LUNG BIOPSY      endoscopic biopsy of lung-negative    PELVIC ABCESS DRAINAGE Bilateral     bilateral inner thigh abcess removal    SKIN LESION EXCISION Right 2025    EXCISE SUBCUTANEOUS MASS RIGHT SIDE OF FACE performed by Norman Magana MD at [facility] MAIN OR     Past Medical History:   Diagnosis Date    Alcohol use disorder     Mass of face 2025     Social History     Tobacco Use    Smoking status: Every Day     Current packs/day: 0.50     Average packs/day: 0.5 packs/day for 35.3 years (17.7 ttl pk-yrs)     Types: Cigarettes     Start date:      Passive exposure: Past    Smokeless tobacco: Never   Vaping Use    Vaping status: Never Used   Substance Use Topics    Alcohol use: Not Currently    Drug use: Not Currently     No current facility-administered medications on file prior to encounter.     Current Outpatient Medications on File Prior to Encounter   Medication Sig Dispense Refill    vitamin D 50 MCG ( UT) CAPS capsule Take 1 capsule by mouth daily      ibuprofen (ADVIL;MOTRIN) 600 MG tablet Take 1 tablet by mouth every 6 hours as needed for Pain      folic acid (FOLVITE) 1 MG tablet Take 1 tablet by mouth daily      hydrOXYzine HCl (ATARAX) 25 MG tablet Take 1 tablet by mouth 3 times daily as needed for Itching      vitamin B-12 (CYANOCOBALAMIN) 100 MCG tablet Take 0.5 tablets by mouth daily      vitamin B-6 (PYRIDOXINE) 100 MG tablet Take 1 tablet by mouth daily      mirtazapine (REMERON) 15 MG tablet Take 1 tablet by mouth nightly 30 tablet 0

## 2025-04-24 NOTE — PROGRESS NOTES
Verified patient name and date of birth, scheduled procedure, and informed consent. Reviewed general discharge instructions and  information.  Assessed patient. Awake, alert, and oriented per baseline. Vital signs stable (see vital sign flowsheet). Respiratory status within defined limits, abdomen soft and non tender. Skin with in defined limits.     Initial RN admission and assessment performed and documented in Endoscopy navigator.     Patient evaluated by anesthesia in pre-procedure holding.     All procedural vital signs, airway assessment, and level of consciousness information monitored and recorded by anesthesia staff on the anesthesia record.     Report received from CRNA post procedure.  Patient transported to recovery area by RN.    Endoscopy post procedure time out was performed and specimens were verified with physician.    Endoscope was pre-cleaned at bedside immediately following procedure by ROCIO.

## 2025-04-24 NOTE — OP NOTE
[address] [address] [phone]               Colonoscopy Operative Report      Indications:  Average risk screening, no prior colonoscopy    :  Estevan Gomez MD    Staff: Circulator: Demetra Dey RN  Endoscopy Technician: Jesús Romo     Referring Provider: Mariluz Ball MD    Sedation:  MAC anesthesia    Procedure Details:  After informed consent was obtained with all risks and benefits of procedure explained and preoperative exam completed, the patient was taken to the endoscopy suite and placed in the left lateral decubitus position.  Upon sequential sedation as per above, a digital rectal exam was performed  And was normal.  The Olympus videocolonoscope  was inserted in the rectum and carefully advanced to the cecum with some difficulty due to looping, requiring abdominal pressure. The cecum was identified by the ileocecal valve and appendiceal orifice.  The quality of preparation was good.  The colonoscope was slowly withdrawn with careful evaluation between folds. Retroflexion in the rectum was performed and was normal..     Findings:   Rectum: Small internal hemorrhoids. 2 sessile polyps, 4-7 mm in size; removed with cold snare polypectomy  Sigmoid: normal  Descending Colon: normal  Transverse Colon: 2 sessile polyps, 4-5 mm in size; removed with cold snare polypectomy  Ascending Colon: 1 sessile polyp, 5 mm in size; removed with cold snare polypectomy. Isolated ulcer with slightly edematous margins found close to the hepatic flexure - unclear significance - possibly NSAID related. Biopsies obtained with cold biopsy forceps.   Cecum: normal  Terminal Ileum: not intubated    Interventions:  5 complete cold snare polypectomies performed and polyps were retrieved.     Specimen Removed:   ID Type Source Tests Collected by Time Destination   1 : Ascending colon polyp Tissue Colon-Ascending SURGICAL PATHOLOGY Estevan Gomez MD 4/24/2025

## 2025-04-24 NOTE — DISCHARGE INSTRUCTIONS
[facility] [address] [address]          David Garcia  [id number]  1970    COLON DISCHARGE INSTRUCTIONS    DISCOMFORT:  Redness at IV site- apply warm compress to area; if redness or soreness persist- contact your physician  There may be a slight amount of blood passed from the rectum  Gaseous discomfort- walking, belching will help relieve any discomfort    DIET:   High Fiber diet.     ACTIVITY:  You may resume your normal daily activities it is recommended that you spend the remainder of the day resting -  avoid any strenuous activity.  You may not operate a vehicle for 12 hours  You may not engage in an occupation involving machinery or appliances for rest of today  You may not drink alcoholic beverages for at least 12 hours  Avoid making any critical decisions for at least 24 hour    CALL M.D.  ANY SIGN OF:   Increasing pain, nausea, vomiting  Abdominal distension (swelling)  New increased bleeding (oral or rectal)  Fever (chills)  Pain in chest area  Bloody discharge from nose or mouth  Shortness of breath     Follow-up Instructions:   Call Dr. Estevan Gomez for any questions or problems.   Telephone # [phone]  Biopsy results will be available in  5 to 7 days    Impression:  -Total 5 polyps (4-7 mm) found in the rectum, transverse and ascending colon - removed and sent for pathology  -Isolated ulcer noted in the ascending colon - unclear significance. Could be NSAID related - biopsies obtained.   -Small internal hemorrhoids.     Recommendations:   -Resume normal medication(s).  -Avoid NSAIDs (ibuprofen etc)  -A high fiber diet with plenty of fluids (up to 8 glasses of water daily) is suggested to relieve these symptoms.  Metamucil, 1 tablespoon once or twice daily can be used to keep bowels regular if needed.  -Await pathology results - repeat colonoscopy in 3 years - based on polyp results.   -Follow up with primary care physician.         Estevan Gomez,  after 3 or 4 days.   Where can you learn more?  Go to https://www.[employer].net/patientEd and enter F228 to learn more about \"Hemorrhoids: Care Instructions.\"  Current as of: June 6, 2022               Content Version: 13.6  © 5758-0547 [email or web address].   Care instructions adapted under license by [employer]. If you have questions about a medical condition or this instruction, always ask your healthcare professional. [email or web address] disclaims any warranty or liability for your use of this information.     Colon Polyps: Care Instructions  Your Care Instructions     Colon polyps are growths in the colon or the rectum. The cause of most colon polyps is not known, and most people who get them do not have any problems. But a certain kind can turn into cancer. For this reason, regular testing for colon polyps is important for people as they get older. It is also important for anyone who has an increased risk for colon cancer.  Polyps are usually found through routine colon cancer screening tests. Although most colon polyps are not cancerous, they are usually removed and then tested for cancer. Screening for colon cancer saves lives because the cancer can usually be cured if it is caught early.  If you have a polyp that is the type that can turn into cancer, you may need more tests to examine your entire colon. The doctor will remove any other polyps that are found, and you will be tested more often.  Follow-up care is a key part of your treatment and safety. Be sure to make and go to all appointments, and call your doctor if you are having problems. It's also a good idea to know your test results and keep a list of the medicines you take.  How can you care for yourself at home?  Regular exams to look for colon polyps are the best way to prevent polyps from turning into colon cancer. These can include stool tests, sigmoidoscopy, colonoscopy, and CT colonography. Talk with your doctor about a testing

## (undated) DEVICE — SUTURE MONOCRYL SZ 4-0 L27IN ABSRB UD L19MM PS-2 1/2 CIR PRIM Y426H

## (undated) DEVICE — TRAP SURG QUAD PARABOLA SLOT DSGN SFTY SCRN TRAPEASE

## (undated) DEVICE — X-RAY DETECTABLE SPONGES,16 PLY: Brand: VISTEC

## (undated) DEVICE — GARMENT,MEDLINE,DVT,INT,CALF,MED, GEN2: Brand: MEDLINE

## (undated) DEVICE — BASIN ST MAJOR-NO CAUTERY: Brand: MEDLINE INDUSTRIES, INC.

## (undated) DEVICE — STERILE COTTON BALLS LARGE 5/P: Brand: MEDLINE

## (undated) DEVICE — SUPPLEMENT DIGESTIVE H2O SOL GI-EASE

## (undated) DEVICE — PENCIL SMK EVAC 10 FT BLADE ELECTRD ROCKER FOR TELSCP

## (undated) DEVICE — GLOVE ORANGE PI 8   MSG9080

## (undated) DEVICE — BLADE CLIPPER GEN PURP NS

## (undated) DEVICE — FORCEP BX JUMBO 4 2.8 MMX240 CM 3.2 MM OVL CUP RADIAL JAW

## (undated) DEVICE — TAPE DSG RETEN W2INXL10YD NONWOVEN COMFORTABLE H2O RESIST

## (undated) DEVICE — SNARE VASC L240CM LOOP W10MM SHTH DIA2.4MM RND STIFF CLD

## (undated) DEVICE — HYPODERMIC SAFETY NEEDLE: Brand: MAGELLAN

## (undated) DEVICE — GLOVE SURG SZ 8 L12IN FNGR THK94MIL STD WHT LTX FREE

## (undated) DEVICE — INTENT OT USE PROVIDES A STERILE INTERFACE BETWEEN THE OPERATING ROOM SURGICAL LAMPS (NON-STERILE) AND THE SURGEON OR STAFF WORKING IN THE STERILE FIELD.: Brand: ASPEN® ALC PLUS LIGHT HANDLE COVER

## (undated) DEVICE — LIQUIBAND RAPID ADHESIVE 36/CS 0.8ML: Brand: MEDLINE

## (undated) DEVICE — SOLUTION IRRIG 1000ML 09% SOD CHL USP PIC PLAS CONTAINER

## (undated) DEVICE — TOWEL,OR,DSP,ST,BLUE,STD,4/PK,20PK/CS: Brand: MEDLINE

## (undated) DEVICE — PACK,BASIC,SIRUS,V: Brand: MEDLINE

## (undated) DEVICE — APPLICATOR MEDICATED 26 CC SOLUTION HI LT ORNG CHLORAPREP

## (undated) DEVICE — ELECTRODE PT RET AD L9FT HI MOIST COND ADH HYDRGEL CORDED